# Patient Record
Sex: FEMALE | Race: WHITE | NOT HISPANIC OR LATINO | Employment: OTHER | URBAN - METROPOLITAN AREA
[De-identification: names, ages, dates, MRNs, and addresses within clinical notes are randomized per-mention and may not be internally consistent; named-entity substitution may affect disease eponyms.]

---

## 2017-01-12 RX ORDER — ALPRAZOLAM 0.5 MG/1
TABLET ORAL
Qty: 60 TABLET | Refills: 0 | OUTPATIENT
Start: 2017-01-12 | End: 2017-02-09 | Stop reason: SDUPTHER

## 2017-01-18 ENCOUNTER — OFFICE VISIT (OUTPATIENT)
Dept: FAMILY MEDICINE CLINIC | Facility: CLINIC | Age: 57
End: 2017-01-18

## 2017-01-18 VITALS
BODY MASS INDEX: 29.23 KG/M2 | SYSTOLIC BLOOD PRESSURE: 118 MMHG | HEIGHT: 63 IN | DIASTOLIC BLOOD PRESSURE: 78 MMHG | WEIGHT: 165 LBS | TEMPERATURE: 97.9 F

## 2017-01-18 DIAGNOSIS — F41.9 ANXIETY: ICD-10-CM

## 2017-01-18 DIAGNOSIS — M77.8 TENDONITIS OF ELBOW, RIGHT: Primary | ICD-10-CM

## 2017-01-18 DIAGNOSIS — M79.7 FIBROMYALGIA, PRIMARY: ICD-10-CM

## 2017-01-18 PROCEDURE — 20605 DRAIN/INJ JOINT/BURSA W/O US: CPT | Performed by: FAMILY MEDICINE

## 2017-01-18 PROCEDURE — 99213 OFFICE O/P EST LOW 20 MIN: CPT | Performed by: FAMILY MEDICINE

## 2017-01-18 RX ORDER — LIDOCAINE HYDROCHLORIDE 10 MG/ML
1 INJECTION, SOLUTION INFILTRATION; PERINEURAL ONCE
Status: COMPLETED | OUTPATIENT
Start: 2017-01-18 | End: 2017-01-18

## 2017-01-18 RX ORDER — DULOXETIN HYDROCHLORIDE 60 MG/1
120 CAPSULE, DELAYED RELEASE ORAL DAILY
Qty: 60 CAPSULE | Refills: 5 | Status: SHIPPED | OUTPATIENT
Start: 2017-01-18 | End: 2017-04-26 | Stop reason: SDUPTHER

## 2017-01-18 RX ORDER — HYDROCODONE BITARTRATE AND ACETAMINOPHEN 7.5; 325 MG/1; MG/1
TABLET ORAL
Qty: 150 TABLET | Refills: 0 | Status: SHIPPED | OUTPATIENT
Start: 2017-01-18 | End: 2017-02-17 | Stop reason: SDUPTHER

## 2017-01-18 RX ORDER — TRIAMCINOLONE ACETONIDE 40 MG/ML
20 INJECTION, SUSPENSION INTRA-ARTICULAR; INTRAMUSCULAR ONCE
Status: COMPLETED | OUTPATIENT
Start: 2017-01-18 | End: 2017-01-18

## 2017-01-18 RX ORDER — CARISOPRODOL 350 MG/1
350 TABLET ORAL 3 TIMES DAILY PRN
Qty: 120 TABLET | Refills: 0 | OUTPATIENT
Start: 2017-01-18 | End: 2017-01-21 | Stop reason: SDUPTHER

## 2017-01-18 RX ADMIN — LIDOCAINE HYDROCHLORIDE 1 ML: 10 INJECTION, SOLUTION INFILTRATION; PERINEURAL at 15:39

## 2017-01-18 RX ADMIN — TRIAMCINOLONE ACETONIDE 20 MG: 40 INJECTION, SUSPENSION INTRA-ARTICULAR; INTRAMUSCULAR at 15:40

## 2017-01-18 NOTE — PROGRESS NOTES
Chief Complaint and HPI    I have reviewed the patient's medical history in detail and updated the computerized patient record.    Subjective: Marina Sotelo is a 56 y.o. female presents   here today for     Anxiety (Xanax bid); Fibromyalgia (sleep sporadic-990mg cymbalta); Cyst (c/o cyst on back of neck x 2 months, tender to touch); and Pain (c/o pain in R elbow, x 6 weeks)      Review of Systems   Constitutional: Negative.    HENT: Negative.    Eyes: Negative.    Respiratory: Negative.    Cardiovascular: Negative.    Gastrointestinal: Negative.    Endocrine: Negative.    Genitourinary: Negative.    Musculoskeletal:        C/o pain in R elbow, c/o hands are contracted and pt has to run hot water to make them relax   Skin:        C/o cyst on back of neck    Allergic/Immunologic: Negative.    Neurological: Negative.    Hematological: Negative.    Psychiatric/Behavioral: Negative.        Physical Exam   Constitutional: She is oriented to person, place, and time. She appears well-developed and well-nourished.   Cardiovascular: Normal rate, regular rhythm, normal heart sounds and intact distal pulses.    Pulmonary/Chest: Effort normal and breath sounds normal.   Neurological: She is alert and oriented to person, place, and time.   Skin: Skin is warm and dry.   Vitals reviewed.      Arthrocentesis  Date/Time: 1/18/2017 3:35 PM  Performed by: CLINT VAUGHN  Authorized by: CLINT VAUGHN   Indications: pain   Body area: elbow  Joint: right elbow  Preparation: Patient was prepped and draped in the usual sterile fashion.  Betamethasone amount: 20 mg  Lidocaine 1% amount: 0.5 mL          Assessment:   Diagnosis Plan   1. Fibromyalgia, primary  lidocaine (XYLOCAINE) 1 % injection 1 mL    triamcinolone acetonide (KENALOG-40) injection 20 mg   2. Anxiety  lidocaine (XYLOCAINE) 1 % injection 1 mL    triamcinolone acetonide (KENALOG-40) injection 20 mg   3. Tendonitis of elbow, right  lidocaine (XYLOCAINE) 1 % injection  1 mL    triamcinolone acetonide (KENALOG-40) injection 20 mg       Plan:  Orders Placed This Encounter   Procedures   • Arthrocentesis     This order was created via procedure documentation       Requested Prescriptions     Signed Prescriptions Disp Refills   • DULoxetine (CYMBALTA) 60 MG capsule 60 capsule 5     Sig: Take 2 capsules by mouth Daily.   • HYDROcodone-acetaminophen (NORCO) 7.5-325 MG per tablet 150 tablet 0     Sig: Take one tablet by mouth every 4 hours prn mod pain  Indications: EARLY  REQUEST 4/13/16   • carisoprodol (SOMA) 350 MG tablet 120 tablet 0     Sig: Take 1 tablet by mouth 3 (Three) Times a Day As Needed for muscle spasms.       All tests and consults since last visit reviewed with patient    Return in about 3 months (around 4/18/2017) for Recheck.

## 2017-01-23 RX ORDER — CARISOPRODOL 350 MG/1
TABLET ORAL
Qty: 120 TABLET | Refills: 0 | OUTPATIENT
Start: 2017-01-23 | End: 2017-02-17 | Stop reason: SDUPTHER

## 2017-02-09 RX ORDER — ALPRAZOLAM 0.5 MG/1
TABLET ORAL
Qty: 60 TABLET | Refills: 0 | OUTPATIENT
Start: 2017-02-09 | End: 2017-03-10 | Stop reason: SDUPTHER

## 2017-02-16 ENCOUNTER — TELEPHONE (OUTPATIENT)
Dept: FAMILY MEDICINE CLINIC | Facility: CLINIC | Age: 57
End: 2017-02-16

## 2017-02-16 RX ORDER — HYDROCODONE BITARTRATE AND ACETAMINOPHEN 7.5; 325 MG/1; MG/1
TABLET ORAL
Qty: 150 TABLET | Refills: 0 | Status: CANCELLED | OUTPATIENT
Start: 2017-02-16

## 2017-02-17 RX ORDER — CARISOPRODOL 350 MG/1
350 TABLET ORAL 3 TIMES DAILY PRN
Qty: 120 TABLET | Refills: 0 | Status: SHIPPED | OUTPATIENT
Start: 2017-02-17 | End: 2017-03-17 | Stop reason: SDUPTHER

## 2017-02-17 RX ORDER — HYDROCODONE BITARTRATE AND ACETAMINOPHEN 7.5; 325 MG/1; MG/1
TABLET ORAL
Qty: 150 TABLET | Refills: 0 | Status: SHIPPED | OUTPATIENT
Start: 2017-02-17 | End: 2017-03-20 | Stop reason: SDUPTHER

## 2017-02-17 RX ORDER — VALACYCLOVIR HYDROCHLORIDE 500 MG/1
TABLET, FILM COATED ORAL
Qty: 20 TABLET | Refills: 0 | Status: SHIPPED | OUTPATIENT
Start: 2017-02-17 | End: 2017-05-07 | Stop reason: SDUPTHER

## 2017-03-11 RX ORDER — ALPRAZOLAM 0.5 MG/1
TABLET ORAL
Qty: 60 TABLET | Refills: 0 | OUTPATIENT
Start: 2017-03-11 | End: 2017-05-10 | Stop reason: SDUPTHER

## 2017-03-17 ENCOUNTER — OFFICE VISIT (OUTPATIENT)
Dept: FAMILY MEDICINE CLINIC | Facility: CLINIC | Age: 57
End: 2017-03-17

## 2017-03-17 VITALS
HEIGHT: 63 IN | SYSTOLIC BLOOD PRESSURE: 132 MMHG | DIASTOLIC BLOOD PRESSURE: 68 MMHG | OXYGEN SATURATION: 95 % | BODY MASS INDEX: 29.59 KG/M2 | TEMPERATURE: 98.6 F | HEART RATE: 84 BPM | WEIGHT: 167 LBS

## 2017-03-17 DIAGNOSIS — Z53.21 PATIENT LEFT AFTER TRIAGE: Primary | ICD-10-CM

## 2017-03-17 RX ORDER — CARISOPRODOL 350 MG/1
350 TABLET ORAL 3 TIMES DAILY PRN
Qty: 120 TABLET | Refills: 0 | Status: SHIPPED | OUTPATIENT
Start: 2017-03-17 | End: 2017-05-15 | Stop reason: SDUPTHER

## 2017-03-17 NOTE — PROGRESS NOTES
"Marina Sotelo is a 56 y.o. female   Chief Complaint   Patient presents with   • Ankle Pain     having pain in ankle and feet looks to be swollen        SILVINO  {[facility]:[id number]::\"Per House Bill #1 Requirements and [place] Board of Medical Licensure Regulations for prescribing of Schedule II and Schedule III with Hydrocodone, and other controlled medications for which the Board requires SILVINO reporting and regulation, the following drug treatment plan was developed and reviewed with the patient on the date of this encounter:\",\"          \",\"Controlled medication(s) taken: ***\",\"      \",\"Medical Indication (including pain relief and/or other physical and psychoosocial functional issue) for treatment: ***\",\"      \",\"Further Diagnostic tests, consultations, or treatments needed: ***\",\"        \",\"Plans for review of plan, adjustment and waning dose and further SILVINO evaluation include: ***\",\"              \",\"Risk for medication abuse for this patient based on physician review is felt to be extremely low.\"}    Subjective   History of Present Illness     Marina Sotelo is a 56 y.o.female who presents with:      The following portions of the patient's history were reviewed and updated as appropriate: past medical history, surgeries, family history, allergies, current medications, past social history and problem list.    A comprehensive review of 14 systems was peformed  Review of Systems   Constitutional: Negative for chills, fatigue, fever and unexpected weight change.   HENT: Negative for ear pain, hearing loss, sinus pressure, sore throat and tinnitus.    Eyes: Negative for pain, discharge and redness.   Respiratory: Negative for cough, shortness of breath and wheezing.    Cardiovascular: Negative for chest pain, palpitations and leg swelling.   Gastrointestinal: Negative for abdominal pain, constipation, diarrhea and nausea.   Endocrine: Negative for cold intolerance and heat intolerance.   Genitourinary: Negative for " "difficulty urinating, flank pain and urgency.   Musculoskeletal: Negative for back pain, joint swelling and myalgias.   Skin: Negative for rash and wound.   Allergic/Immunologic: Negative for environmental allergies and food allergies.   Neurological: Negative for dizziness, seizures, numbness and headaches.   Hematological: Negative for adenopathy. Does not bruise/bleed easily.   Psychiatric/Behavioral: Negative for decreased concentration, dysphoric mood and sleep disturbance. The patient is not nervous/anxious.    All other systems reviewed and are negative.      I have reviewed the patient's medical history in detail and updated the computerized patient record.  ***    Objective   Vitals:    03/17/17 1049   BP: 132/68   BP Location: Left arm   Patient Position: Sitting   Cuff Size: Adult   Pulse: 84   Temp: 98.6 °F (37 °C)   TempSrc: Oral   SpO2: 95%   Weight: 167 lb (75.8 kg)   Height: 63\" (160 cm)   PainSc: 8  Comment: back and ankles and heals of feet looks to be swollen a liittle   PainLoc: Ankle         ***  Physical Exam    Procedures    Reviewed consult notes from ***    Reviewed old notes from [facility]***    Reviewed actual xray films ***                        Assessment/Plan     There are no diagnoses linked to this encounter.       Ruperto Overton MA  3/17/2017  10:57 AM      "

## 2017-03-20 RX ORDER — HYDROCODONE BITARTRATE AND ACETAMINOPHEN 7.5; 325 MG/1; MG/1
TABLET ORAL
Qty: 150 TABLET | Refills: 0 | Status: SHIPPED | OUTPATIENT
Start: 2017-03-20 | End: 2017-04-17 | Stop reason: SDUPTHER

## 2017-04-17 RX ORDER — HYDROCODONE BITARTRATE AND ACETAMINOPHEN 7.5; 325 MG/1; MG/1
TABLET ORAL
Qty: 150 TABLET | Refills: 0 | Status: SHIPPED | OUTPATIENT
Start: 2017-04-17 | End: 2017-04-26 | Stop reason: SDUPTHER

## 2017-04-26 ENCOUNTER — OFFICE VISIT (OUTPATIENT)
Dept: FAMILY MEDICINE CLINIC | Facility: CLINIC | Age: 57
End: 2017-04-26

## 2017-04-26 VITALS
OXYGEN SATURATION: 97 % | SYSTOLIC BLOOD PRESSURE: 118 MMHG | HEART RATE: 90 BPM | HEIGHT: 63 IN | BODY MASS INDEX: 29.48 KG/M2 | WEIGHT: 166.4 LBS | DIASTOLIC BLOOD PRESSURE: 82 MMHG | TEMPERATURE: 97.7 F

## 2017-04-26 DIAGNOSIS — G89.4 CHRONIC PAIN SYNDROME: ICD-10-CM

## 2017-04-26 DIAGNOSIS — F41.9 ANXIETY: ICD-10-CM

## 2017-04-26 DIAGNOSIS — L40.9 PSORIASIS (A TYPE OF SKIN INFLAMMATION): Primary | ICD-10-CM

## 2017-04-26 PROCEDURE — 99213 OFFICE O/P EST LOW 20 MIN: CPT | Performed by: FAMILY MEDICINE

## 2017-04-26 RX ORDER — DULOXETIN HYDROCHLORIDE 60 MG/1
120 CAPSULE, DELAYED RELEASE ORAL DAILY
Qty: 60 CAPSULE | Refills: 5 | Status: SHIPPED | OUTPATIENT
Start: 2017-04-26 | End: 2018-08-09 | Stop reason: SDUPTHER

## 2017-04-26 RX ORDER — HYDROCODONE BITARTRATE AND ACETAMINOPHEN 7.5; 325 MG/1; MG/1
TABLET ORAL
Qty: 150 TABLET | Refills: 0 | Status: SHIPPED | OUTPATIENT
Start: 2017-04-26 | End: 2017-06-07 | Stop reason: SDUPTHER

## 2017-04-26 NOTE — PROGRESS NOTES
Chief Complaint and HPI istory in detail and updated the computerized patient record.    Subjective: Marina Sotelo is a 56 y.o. female presents   here today for  Review of Systems   Constitutional: Negative for chills, fatigue, fever and unexpected weight change.   HENT: Negative for ear pain, hearing loss, sinus pressure, sore throat and tinnitus.    Eyes: Negative for pain, discharge and redness.   Respiratory: Negative for cough, shortness of breath and wheezing.    Cardiovascular: Negative for chest pain, palpitations and leg swelling.   Gastrointestinal: Negative for abdominal pain, constipation, diarrhea and nausea.   Endocrine: Negative for cold intolerance and heat intolerance.   Genitourinary: Negative for difficulty urinating, flank pain and urgency.   Musculoskeletal: Negative for back pain, joint swelling and myalgias.   Skin: Negative for rash and wound.   Allergic/Immunologic: Negative for environmental allergies and food allergies.   Neurological: Negative for dizziness, seizures, numbness and headaches.   Hematological: Negative for adenopathy. Does not bruise/bleed easily.   Psychiatric/Behavioral: Negative for decreased concentration, dysphoric mood and sleep disturbance. The patient is not nervous/anxious.    All other systems reviewed and are negative.      Physical Exam   Constitutional: She is oriented to person, place, and time. She appears well-developed and well-nourished.   Cardiovascular: Normal rate, regular rhythm, normal heart sounds and intact distal pulses.    Pulmonary/Chest: Effort normal and breath sounds normal.   Neurological: She is alert and oriented to person, place, and time.   Psychiatric: She has a normal mood and affect. Her behavior is normal.   Vitals reviewed.      Procedures    Assessment:   Diagnosis Plan   1. Psoriasis (a type of skin inflammation)  Ambulatory Referral to Dermatology   2. Anxiety     3. Chronic pain syndrome         Plan:  Orders Placed This  Encounter   Procedures   • Ambulatory Referral to Dermatology     Referral Priority:   Routine     Referral Type:   Consultation     Referral Reason:   Specialty Services Required     Referred to Provider:   Denice Felix MD     Requested Specialty:   Dermatology     Number of Visits Requested:   1       Requested Prescriptions     Signed Prescriptions Disp Refills   • DULoxetine (CYMBALTA) 60 MG capsule 60 capsule 5     Sig: Take 2 capsules by mouth Daily.   • HYDROcodone-acetaminophen (NORCO) 7.5-325 MG per tablet 150 tablet 0     Sig: Take one tablet by mouth every 4 hours prn mod pain  Indications: EARLY  REQUEST 4/13/16       All tests and consults since last visit reviewed with patient    Return in about 3 months (around 7/26/2017).

## 2017-05-08 RX ORDER — VALACYCLOVIR HYDROCHLORIDE 500 MG/1
TABLET, FILM COATED ORAL
Qty: 20 TABLET | Refills: 0 | Status: SHIPPED | OUTPATIENT
Start: 2017-05-08 | End: 2017-07-20 | Stop reason: SDUPTHER

## 2017-05-10 RX ORDER — ALPRAZOLAM 0.5 MG/1
0.5 TABLET ORAL 2 TIMES DAILY
Qty: 60 TABLET | Refills: 0 | OUTPATIENT
Start: 2017-05-10 | End: 2017-06-06 | Stop reason: SDUPTHER

## 2017-05-15 RX ORDER — CARISOPRODOL 350 MG/1
350 TABLET ORAL 3 TIMES DAILY PRN
Qty: 120 TABLET | Refills: 0 | OUTPATIENT
Start: 2017-05-15 | End: 2017-06-12 | Stop reason: SDUPTHER

## 2017-06-06 RX ORDER — ALPRAZOLAM 0.5 MG/1
0.5 TABLET ORAL 2 TIMES DAILY
Qty: 60 TABLET | Refills: 0 | OUTPATIENT
Start: 2017-06-06 | End: 2017-07-03 | Stop reason: SDUPTHER

## 2017-06-07 RX ORDER — HYDROCODONE BITARTRATE AND ACETAMINOPHEN 7.5; 325 MG/1; MG/1
TABLET ORAL
Qty: 150 TABLET | Refills: 0 | Status: SHIPPED | OUTPATIENT
Start: 2017-06-07 | End: 2017-07-05 | Stop reason: SDUPTHER

## 2017-06-07 NOTE — TELEPHONE ENCOUNTER
----- Message from Jolanta العلي sent at 6/7/2017 11:34 AM EDT -----  Refill norco   lsd 4-26-17    Last office visit 4/26/17  Last fill 4/26/17  Last ammy 4/26/17

## 2017-06-12 RX ORDER — CARISOPRODOL 350 MG/1
350 TABLET ORAL 3 TIMES DAILY PRN
Qty: 120 TABLET | Refills: 0 | OUTPATIENT
Start: 2017-06-12 | End: 2017-07-07

## 2017-07-03 RX ORDER — ALPRAZOLAM 0.5 MG/1
TABLET ORAL
Qty: 60 TABLET | Refills: 0 | OUTPATIENT
Start: 2017-07-03 | End: 2017-07-03 | Stop reason: SDUPTHER

## 2017-07-05 ENCOUNTER — TELEPHONE (OUTPATIENT)
Dept: FAMILY MEDICINE CLINIC | Facility: CLINIC | Age: 57
End: 2017-07-05

## 2017-07-05 RX ORDER — ALPRAZOLAM 0.5 MG/1
TABLET ORAL
Qty: 60 TABLET | Refills: 0 | OUTPATIENT
Start: 2017-07-05 | End: 2017-08-04 | Stop reason: SDUPTHER

## 2017-07-05 RX ORDER — HYDROCODONE BITARTRATE AND ACETAMINOPHEN 7.5; 325 MG/1; MG/1
TABLET ORAL
Qty: 150 TABLET | Refills: 0 | Status: SHIPPED | OUTPATIENT
Start: 2017-07-05 | End: 2017-08-02 | Stop reason: SDUPTHER

## 2017-07-05 NOTE — TELEPHONE ENCOUNTER
----- Message from Jolanta العلي sent at 7/5/2017 10:32 AM EDT -----  Refill hydrocodone         lsd 4-26-17  Last fill 6/7/17  Last ammy 4/26/17  Due for new Narc contract  Next appt 8/2/17

## 2017-07-07 RX ORDER — CARISOPRODOL 350 MG/1
350 TABLET ORAL 4 TIMES DAILY PRN
Qty: 120 TABLET | Refills: 0 | OUTPATIENT
Start: 2017-07-07 | End: 2017-08-02 | Stop reason: SDUPTHER

## 2017-07-20 RX ORDER — VALACYCLOVIR HYDROCHLORIDE 500 MG/1
TABLET, FILM COATED ORAL
Qty: 20 TABLET | Refills: 0 | Status: SHIPPED | OUTPATIENT
Start: 2017-07-20 | End: 2017-08-02 | Stop reason: SDUPTHER

## 2017-07-28 RX ORDER — DULOXETIN HYDROCHLORIDE 60 MG/1
CAPSULE, DELAYED RELEASE ORAL
Qty: 60 CAPSULE | Refills: 0 | Status: SHIPPED | OUTPATIENT
Start: 2017-07-28 | End: 2017-08-25 | Stop reason: SDUPTHER

## 2017-08-02 ENCOUNTER — OFFICE VISIT (OUTPATIENT)
Dept: FAMILY MEDICINE CLINIC | Facility: CLINIC | Age: 57
End: 2017-08-02

## 2017-08-02 VITALS
SYSTOLIC BLOOD PRESSURE: 132 MMHG | TEMPERATURE: 98.2 F | BODY MASS INDEX: 30.02 KG/M2 | DIASTOLIC BLOOD PRESSURE: 70 MMHG | OXYGEN SATURATION: 96 % | HEART RATE: 68 BPM | WEIGHT: 169.4 LBS | HEIGHT: 63 IN

## 2017-08-02 DIAGNOSIS — G89.4 CHRONIC PAIN SYNDROME: ICD-10-CM

## 2017-08-02 DIAGNOSIS — M15.9 PRIMARY OSTEOARTHRITIS INVOLVING MULTIPLE JOINTS: ICD-10-CM

## 2017-08-02 DIAGNOSIS — Z79.899 ENCOUNTER FOR LONG-TERM (CURRENT) USE OF MEDICATIONS: Primary | ICD-10-CM

## 2017-08-02 LAB
POC AMPHETAMINES: NEGATIVE
POC BARBITURATES: NEGATIVE
POC BENZODIAZEPHINES: POSITIVE
POC COCAINE: NEGATIVE
POC METHADONE: NEGATIVE
POC METHAMPHETAMINE SCREEN URINE: NEGATIVE
POC OPIATES: POSITIVE
POC OXYCODONE: NEGATIVE
POC PHENCYCLIDINE: NEGATIVE
POC PROPOXYPHENE: NEGATIVE
POC THC: NEGATIVE
POC TRICYCLIC ANTIDEPRESSANTS: NEGATIVE

## 2017-08-02 PROCEDURE — 99213 OFFICE O/P EST LOW 20 MIN: CPT | Performed by: FAMILY MEDICINE

## 2017-08-02 RX ORDER — CARISOPRODOL 350 MG/1
350 TABLET ORAL 4 TIMES DAILY PRN
Qty: 120 TABLET | Refills: 2 | Status: SHIPPED | OUTPATIENT
Start: 2017-08-02 | End: 2017-09-04 | Stop reason: SDUPTHER

## 2017-08-02 RX ORDER — HYDROCODONE BITARTRATE AND ACETAMINOPHEN 7.5; 325 MG/1; MG/1
TABLET ORAL
Qty: 150 TABLET | Refills: 0 | Status: SHIPPED | OUTPATIENT
Start: 2017-08-02 | End: 2017-09-01 | Stop reason: SDUPTHER

## 2017-08-02 RX ORDER — HALOBETASOL PROPIONATE 0.05 %
0.05 OINTMENT (GRAM) TOPICAL 2 TIMES DAILY
COMMUNITY
Start: 2017-06-28 | End: 2017-10-09 | Stop reason: SDUPTHER

## 2017-08-02 NOTE — PROGRESS NOTES
Subjective.../ HPI  I have reviewed the patient's medical history in detail and updated the computerized patient record.    Subjective: Marina Sotelo is a 56 y.o. female presents here today for-    Arthritis (follow up )  Has sever arthritis 25 yrs. !5 yrs on pain meds Needs meds to be able to to work    No family history on file.    Social History     Social History   • Marital status:      Spouse name: N/A   • Number of children: N/A   • Years of education: N/A     Occupational History   • Not on file.     Social History Main Topics   • Smoking status: Current Every Day Smoker   • Smokeless tobacco: Never Used   • Alcohol use Not on file      Comment: occasional   • Drug use: Yes     Special: Hydrocodone   • Sexual activity: Defer     Other Topics Concern   • Not on file     Social History Narrative       Review of Systems   Constitutional: Negative for chills, fatigue, fever and unexpected weight change.   HENT: Negative for ear pain, hearing loss, sinus pressure, sore throat and tinnitus.    Eyes: Negative for pain, discharge and redness.   Respiratory: Negative for cough, shortness of breath and wheezing.    Cardiovascular: Negative for chest pain, palpitations and leg swelling.   Gastrointestinal: Negative for abdominal pain, constipation, diarrhea and nausea.   Endocrine: Negative for cold intolerance and heat intolerance.   Genitourinary: Negative for difficulty urinating, flank pain and urgency.   Musculoskeletal: Negative for back pain, joint swelling and myalgias.   Skin: Negative for rash and wound.   Allergic/Immunologic: Negative for environmental allergies and food allergies.   Neurological: Negative for dizziness, seizures, numbness and headaches.   Hematological: Negative for adenopathy. Does not bruise/bleed easily.   Psychiatric/Behavioral: Negative for decreased concentration, dysphoric mood and sleep disturbance. The patient is not nervous/anxious.    All other systems reviewed and  are negative.        Physical Exam   Constitutional: She is oriented to person, place, and time. She appears well-developed and well-nourished.   Cardiovascular: Normal rate, regular rhythm, normal heart sounds and intact distal pulses.    Pulmonary/Chest: Effort normal and breath sounds normal.   Neurological: She is alert and oriented to person, place, and time.   Psychiatric: She has a normal mood and affect. Her behavior is normal.   Vitals reviewed.      Procedures    Marina was seen today for arthritis.    Diagnoses and all orders for this visit:    Encounter for long-term (current) use of medications  -     POC Urine Drug Screen, Triage    Chronic pain syndrome  -     POC Urine Drug Screen, Triage  -     HYDROcodone-acetaminophen (NORCO) 7.5-325 MG per tablet; Take one tablet by mouth every 4 hours prn mod pain  Indications: EARLY  REQUEST 4/13/16  -     carisoprodol (SOMA) 350 MG tablet; Take 1 tablet by mouth 4 (Four) Times a Day As Needed for Muscle Spasms.    Primary osteoarthritis involving multiple joints  -     POC Urine Drug Screen, Triage  -     HYDROcodone-acetaminophen (NORCO) 7.5-325 MG per tablet; Take one tablet by mouth every 4 hours prn mod pain  Indications: EARLY  REQUEST 4/13/16  -     carisoprodol (SOMA) 350 MG tablet; Take 1 tablet by mouth 4 (Four) Times a Day As Needed for Muscle Spasms.        Requested Prescriptions     Signed Prescriptions Disp Refills   • HYDROcodone-acetaminophen (NORCO) 7.5-325 MG per tablet 150 tablet 0     Sig: Take one tablet by mouth every 4 hours prn mod pain  Indications: EARLY  REQUEST 4/13/16   • carisoprodol (SOMA) 350 MG tablet 120 tablet 2     Sig: Take 1 tablet by mouth 4 (Four) Times a Day As Needed for Muscle Spasms.       Results Review:    REVIEWED AND DISCUSSED CLINICAL RESULTS WITH PATIENT    Return in about 3 months (around 11/2/2017) for Recheck.

## 2017-08-03 RX ORDER — VALACYCLOVIR HYDROCHLORIDE 500 MG/1
TABLET, FILM COATED ORAL
Qty: 20 TABLET | Refills: 0 | Status: SHIPPED | OUTPATIENT
Start: 2017-08-03 | End: 2017-11-21 | Stop reason: SDUPTHER

## 2017-08-04 RX ORDER — ALPRAZOLAM 0.5 MG/1
0.5 TABLET ORAL 2 TIMES DAILY
Qty: 60 TABLET | Refills: 0 | OUTPATIENT
Start: 2017-08-04 | End: 2017-09-02 | Stop reason: SDUPTHER

## 2017-08-25 RX ORDER — DULOXETIN HYDROCHLORIDE 60 MG/1
CAPSULE, DELAYED RELEASE ORAL
Qty: 60 CAPSULE | Refills: 0 | Status: SHIPPED | OUTPATIENT
Start: 2017-08-25 | End: 2017-09-25 | Stop reason: SDUPTHER

## 2017-09-01 DIAGNOSIS — M15.9 PRIMARY OSTEOARTHRITIS INVOLVING MULTIPLE JOINTS: ICD-10-CM

## 2017-09-01 DIAGNOSIS — G89.4 CHRONIC PAIN SYNDROME: ICD-10-CM

## 2017-09-01 RX ORDER — HYDROCODONE BITARTRATE AND ACETAMINOPHEN 7.5; 325 MG/1; MG/1
TABLET ORAL
Qty: 150 TABLET | Refills: 0 | Status: SHIPPED | OUTPATIENT
Start: 2017-09-01 | End: 2017-10-02 | Stop reason: SDUPTHER

## 2017-09-02 RX ORDER — ALPRAZOLAM 0.5 MG/1
0.5 TABLET ORAL 2 TIMES DAILY
Qty: 60 TABLET | Refills: 0 | OUTPATIENT
Start: 2017-09-02 | End: 2017-10-03 | Stop reason: SDUPTHER

## 2017-09-04 DIAGNOSIS — G89.4 CHRONIC PAIN SYNDROME: ICD-10-CM

## 2017-09-04 DIAGNOSIS — M15.9 PRIMARY OSTEOARTHRITIS INVOLVING MULTIPLE JOINTS: ICD-10-CM

## 2017-09-05 RX ORDER — CARISOPRODOL 350 MG/1
TABLET ORAL
Qty: 120 TABLET | Refills: 0 | Status: SHIPPED | OUTPATIENT
Start: 2017-09-05 | End: 2017-09-06 | Stop reason: SDUPTHER

## 2017-09-06 DIAGNOSIS — M15.9 PRIMARY OSTEOARTHRITIS INVOLVING MULTIPLE JOINTS: ICD-10-CM

## 2017-09-06 DIAGNOSIS — G89.4 CHRONIC PAIN SYNDROME: ICD-10-CM

## 2017-09-06 RX ORDER — CARISOPRODOL 350 MG/1
TABLET ORAL
Qty: 120 TABLET | Refills: 0 | Status: SHIPPED | OUTPATIENT
Start: 2017-09-06 | End: 2017-10-02 | Stop reason: SDUPTHER

## 2017-09-26 RX ORDER — DULOXETIN HYDROCHLORIDE 60 MG/1
CAPSULE, DELAYED RELEASE ORAL
Qty: 60 CAPSULE | Refills: 0 | Status: SHIPPED | OUTPATIENT
Start: 2017-09-26 | End: 2017-10-09

## 2017-10-02 DIAGNOSIS — G89.4 CHRONIC PAIN SYNDROME: ICD-10-CM

## 2017-10-02 DIAGNOSIS — M15.9 PRIMARY OSTEOARTHRITIS INVOLVING MULTIPLE JOINTS: ICD-10-CM

## 2017-10-02 RX ORDER — CARISOPRODOL 350 MG/1
TABLET ORAL
Qty: 120 TABLET | Refills: 0 | Status: SHIPPED | OUTPATIENT
Start: 2017-10-02 | End: 2017-10-30 | Stop reason: SDUPTHER

## 2017-10-02 RX ORDER — HYDROCODONE BITARTRATE AND ACETAMINOPHEN 7.5; 325 MG/1; MG/1
TABLET ORAL
Qty: 150 TABLET | Refills: 0 | Status: SHIPPED | OUTPATIENT
Start: 2017-10-02 | End: 2017-10-30 | Stop reason: SDUPTHER

## 2017-10-02 NOTE — TELEPHONE ENCOUNTER
Refill Norco    Last visit -8/2/17   Last refill - 9/1/17  Last ammy - 8/1/17  Next appt- 10/31/17              ----- Message from Mere Montanez sent at 10/2/2017 11:27 AM EDT -----  Regarding: SCRIPT  Contact: [phone]  LDS: 8/2/17  NEXT APPT: 10/31/17      HYDROcodone-acetaminophen (NORCO) 7.5-325 MG per tablet    INFORMED PATIENT TO GIVE 48 HRS FOR SCRIPT AND SOMEONE WILL CALL WHEN IT IS READY FOR .    THANK YOU

## 2017-10-03 RX ORDER — ALPRAZOLAM 0.5 MG/1
0.5 TABLET ORAL 2 TIMES DAILY
Qty: 60 TABLET | Refills: 0 | OUTPATIENT
Start: 2017-10-03 | End: 2017-11-03 | Stop reason: SDUPTHER

## 2017-10-09 ENCOUNTER — OFFICE VISIT (OUTPATIENT)
Dept: FAMILY MEDICINE CLINIC | Facility: CLINIC | Age: 57
End: 2017-10-09

## 2017-10-09 VITALS
HEART RATE: 92 BPM | SYSTOLIC BLOOD PRESSURE: 126 MMHG | TEMPERATURE: 98.1 F | BODY MASS INDEX: 30.02 KG/M2 | OXYGEN SATURATION: 99 % | HEIGHT: 63 IN | WEIGHT: 169.4 LBS | DIASTOLIC BLOOD PRESSURE: 78 MMHG

## 2017-10-09 DIAGNOSIS — H60.393 OTHER INFECTIVE ACUTE OTITIS EXTERNA OF BOTH EARS: Primary | ICD-10-CM

## 2017-10-09 DIAGNOSIS — F41.9 ANXIETY: ICD-10-CM

## 2017-10-09 DIAGNOSIS — G89.4 CHRONIC PAIN SYNDROME: ICD-10-CM

## 2017-10-09 DIAGNOSIS — Z23 IMMUNIZATION DUE: ICD-10-CM

## 2017-10-09 PROCEDURE — 90471 IMMUNIZATION ADMIN: CPT | Performed by: NURSE PRACTITIONER

## 2017-10-09 PROCEDURE — 99213 OFFICE O/P EST LOW 20 MIN: CPT | Performed by: NURSE PRACTITIONER

## 2017-10-09 PROCEDURE — 90686 IIV4 VACC NO PRSV 0.5 ML IM: CPT | Performed by: NURSE PRACTITIONER

## 2017-10-09 NOTE — PATIENT INSTRUCTIONS
Bacitracin; Hydrocortisone; Neomycin; Polymyxin B eye ointment  What is this medicine?  BACITRACIN; HYDROCORTISONE; NEOMYCIN; POLYMYXIN B (bass i TRAY sin; edson droe KOR ti sone; nee oh MYE sin; rashard i MIX in bee) helps to reduce swelling, redness, and itching of the eyes. It is also used to treat eye infections.  This medicine may be used for other purposes; ask your health care provider or pharmacist if you have questions.  COMMON BRAND NAME(S): AK-Spore HC, AK-Spore HC Ophthalmic, Cortisporin, Cortomycin, Jaspreet-Polycin HC, Ocu-Emmanuel  What should I tell my health care provider before I take this medicine?  They need to know if you have any of these conditions:  -any other active infection  -glaucoma  -recent cataract surgery  -wear contact lenses  -an unusual or allergic reaction to bacitracin, hydrocortisone, neomycin, polymyxin B, corticosteroids, other medicines, foods, dyes, or preservatives  -pregnant or trying to get pregnant  -breast-feeding  How should I use this medicine?  This medicine is only for use in the eye. Follow the directions on the prescription label. Wash hands before and after use. Tilt your head back slightly and pull your lower eyelid down with your index finger to form a pouch. Try not to touch the tip of the tube to your eye, fingertips, or other surface. Squeeze the end of the ointment tube to apply a thin layer of the ointment to the inside of the eyelid. Close the eye gently to spread the ointment. Do not use your medicine more often than directed. Finish the full course of medicine prescribed by your doctor or health care professional even if think your condition is better. Do not stop using except on the advice of your doctor or health care professional.  Talk to your pediatrician regarding the use of this medicine in children. Special care may be needed.  Overdosage: If you think you have taken too much of this medicine contact a poison control center or emergency room at once.  NOTE:  This medicine is only for you. Do not share this medicine with others.  What if I miss a dose?  If you miss a dose, use it as soon as you can. If it is almost time for your next dose, use only that dose. Do not use double or extra doses.  What may interact with this medicine?  Interactions are not expected. Do not use any other eye products without telling your doctor or health care professional.  This list may not describe all possible interactions. Give your health care provider a list of all the medicines, herbs, non-prescription drugs, or dietary supplements you use. Also tell them if you smoke, drink alcohol, or use illegal drugs. Some items may interact with your medicine.  What should I watch for while using this medicine?  Check with your doctor or health care professional if your condition does not get better after 2 days, or if it gets worse. Do not use longer than 10 days unless instructed by your doctor.  Tell your doctor or health care professional if you are exposed to anyone with measles or chickenpox, or if you develop sores or blisters that do not heal properly.  If you wear contact lenses, ask your doctor or health care professional when you can use your lenses again.  A burning or stinging reaction that does not go away may mean you are allergic to this product. Stop using and call your doctor or health care professional.  This medicine can make certain eye conditions worse. Only use it for conditions for which your doctor or health care professional has prescribed.  To prevent the spread of infection, do not share eye products, towels, and washcloths with anyone else. Throw away any unused eye products.  What side effects may I notice from receiving this medicine?  Side effects that you should report to your doctor or health care professional as soon as possible:  -allergic reactions like skin rash, itching or hives, swelling of the face, lips, or tongue  -changes in vision  -watery eyes  Side  effects that usually do not require medical attention (report to your doctor or health care professional if they continue or are bothersome):  -eye irritation, itching  -mild burning, redness or stinging in the eye  -temporary watering or blurring of vision  This list may not describe all possible side effects. Call your doctor for medical advice about side effects. You may report side effects to FDA at 3-453-WRZ-3748.  Where should I keep my medicine?  Keep out of the reach of children.  Store between 15 and 25 degrees C (59 and 77 degrees F). Throw away any unused medicine after the expiration date.  NOTE: This sheet is a summary. It may not cover all possible information. If you have questions about this medicine, talk to your doctor, pharmacist, or health care provider.     © 2017, [employer]. (2015-07-23 09:42:52)

## 2017-10-09 NOTE — PROGRESS NOTES
Subjective   Marina Sotelo is a 56 y.o. female presents with right ear pain since Friday, gradually worsening with decreased hearing. Feels full. Pain was going into jaw line but now improved.  Also presents for 3 month follow up for pain management and anxiety. Takes hydrocodone for arthritis pain. 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 Takes xanax for anxiety. Typically takes two daily.     Earache    There is pain in the right ear. This is a new problem. The current episode started in the past 7 days. The problem occurs every few minutes. There has been no fever. The fever has been present for less than 1 day. The pain is at a severity of 8/10. The pain is moderate. Associated symptoms include coughing (intermittent, due to postnasal draiange), neck pain (on right side, now resolved) and rhinorrhea. Pertinent negatives include no abdominal pain, diarrhea, ear discharge, headaches, hearing loss, rash, sore throat or vomiting. She has tried ear drops for the symptoms. The treatment provided moderate relief. There is no history of a chronic ear infection, hearing loss or a tympanostomy tube.        The following portions of the patient's history were reviewed and updated as appropriate: allergies, current medications, past family history, past medical history, past social history, past surgical history and problem list.    Review of Systems   Constitutional: Negative.    HENT: Positive for ear pain and rhinorrhea. Negative for ear discharge, hearing loss and sore throat.    Eyes: Negative.    Respiratory: Positive for cough (intermittent, due to postnasal draiange).    Cardiovascular: Negative.    Gastrointestinal: Negative.  Negative for abdominal pain, diarrhea and vomiting.   Endocrine: Negative.    Genitourinary: Negative.    Musculoskeletal: Positive for neck pain (on right side, now resolved).   Skin: Negative.  Negative for rash.   Allergic/Immunologic: Negative.     Neurological: Negative.  Negative for headaches.   Hematological: Negative.    Psychiatric/Behavioral: Negative.        Objective   Physical Exam   Constitutional: She appears well-developed and well-nourished.   HENT:   Right Ear: Tympanic membrane normal.   Left Ear: Tympanic membrane normal.   Nose: Nose normal.   Mouth/Throat: Uvula is midline, oropharynx is clear and moist and mucous membranes are normal. No tonsillar exudate.   Pain with palpating tragus/pinna bilaterally  TM visualized bilaterally, canal edematous, moist, no erythema       Neck: Neck supple.   Cardiovascular: Normal rate, regular rhythm and normal heart sounds.  Exam reveals no gallop and no friction rub.    No murmur heard.  Pulmonary/Chest: Breath sounds normal. No respiratory distress. She has no wheezes. She has no rales.   Skin: Skin is warm and dry.   Psychiatric: She has a normal mood and affect.   Vitals reviewed.      Assessment/Plan   Marina was seen today for earache.    Diagnoses and all orders for this visit:    Other infective acute otitis externa of both ears    Anxiety    Chronic pain syndrome    Other orders  -     neomycin-polymyxin-hydrocortisone (CORTISPORIN) 3.5-38884-3 otic solution; Administer 3 drops into both ears 3 (Three) Times a Day.

## 2017-10-11 ENCOUNTER — TELEPHONE (OUTPATIENT)
Dept: FAMILY MEDICINE CLINIC | Facility: CLINIC | Age: 57
End: 2017-10-11

## 2017-10-11 RX ORDER — CEFDINIR 300 MG/1
300 CAPSULE ORAL 2 TIMES DAILY
Qty: 20 CAPSULE | Refills: 0 | Status: SHIPPED | OUTPATIENT
Start: 2017-10-11 | End: 2017-10-13 | Stop reason: SDUPTHER

## 2017-10-11 NOTE — TELEPHONE ENCOUNTER
----- Message from Mere Montanez sent at 10/11/2017 12:03 PM EDT -----  Regarding: CALL BACK  Contact: [phone]  PATIENT SAID SHE NEEDS AN ANTIBIOTIC FOR HER EARS. DROPS NOT WORKING AND BOTH EARS ARE NOW INFECTED. SHE IS EXPERIENCING LOSS OF BALANCE AND HEARING.    Allergies:  Penicillins    PLEASE CALL    THANK YOU

## 2017-10-13 ENCOUNTER — OFFICE VISIT (OUTPATIENT)
Dept: FAMILY MEDICINE CLINIC | Facility: CLINIC | Age: 57
End: 2017-10-13

## 2017-10-13 VITALS
WEIGHT: 173 LBS | HEART RATE: 67 BPM | OXYGEN SATURATION: 98 % | BODY MASS INDEX: 30.65 KG/M2 | SYSTOLIC BLOOD PRESSURE: 126 MMHG | DIASTOLIC BLOOD PRESSURE: 74 MMHG | TEMPERATURE: 98.3 F | HEIGHT: 63 IN

## 2017-10-13 DIAGNOSIS — J30.1 ACUTE SEASONAL ALLERGIC RHINITIS DUE TO POLLEN: ICD-10-CM

## 2017-10-13 DIAGNOSIS — J01.00 ACUTE NON-RECURRENT MAXILLARY SINUSITIS: Primary | ICD-10-CM

## 2017-10-13 PROBLEM — J30.9 ACUTE ALLERGIC RHINITIS: Status: ACTIVE | Noted: 2017-10-13

## 2017-10-13 PROCEDURE — 99213 OFFICE O/P EST LOW 20 MIN: CPT | Performed by: FAMILY MEDICINE

## 2017-10-13 RX ORDER — AZELASTINE 1 MG/ML
2 SPRAY, METERED NASAL 2 TIMES DAILY
Qty: 1 EACH | Refills: 12 | Status: SHIPPED | OUTPATIENT
Start: 2017-10-13 | End: 2018-03-01

## 2017-10-13 RX ORDER — MONTELUKAST SODIUM 10 MG/1
10 TABLET ORAL NIGHTLY
Qty: 30 TABLET | Refills: 11 | Status: SHIPPED | OUTPATIENT
Start: 2017-10-13 | End: 2018-08-09 | Stop reason: SDUPTHER

## 2017-10-13 RX ORDER — AZELASTINE HYDROCHLORIDE, FLUTICASONE PROPIONATE 137; 50 UG/1; UG/1
1 SPRAY, METERED NASAL 2 TIMES DAILY
Qty: 1 BOTTLE | Refills: 11 | Status: SHIPPED | OUTPATIENT
Start: 2017-10-13 | End: 2018-03-01

## 2017-10-13 RX ORDER — CEFDINIR 300 MG/1
300 CAPSULE ORAL 2 TIMES DAILY
Qty: 20 CAPSULE | Refills: 0 | Status: SHIPPED | OUTPATIENT
Start: 2017-10-13 | End: 2018-03-01

## 2017-10-13 RX ORDER — NEOMYCIN SULFATE, POLYMYXIN B SULFATE AND HYDROCORTISONE 10; 3.5; 1 MG/ML; MG/ML; [USP'U]/ML
SUSPENSION/ DROPS AURICULAR (OTIC)
COMMUNITY
Start: 2017-10-09 | End: 2018-03-01

## 2017-10-13 NOTE — PROGRESS NOTES
Subjective.../ HPI  I have reviewed the patient's medical history in detail and updated the computerized patient record.    Subjective: Marina Sotelo is a 56 y.o. female presents here today for-    Earache (bilateral earache and balance problems for a week)      No family history on file.    Social History     Social History   • Marital status:      Spouse name: N/A   • Number of children: N/A   • Years of education: N/A     Occupational History   • Not on file.     Social History Main Topics   • Smoking status: Current Every Day Smoker   • Smokeless tobacco: Never Used   • Alcohol use Not on file      Comment: occasional   • Drug use: Yes     Special: Hydrocodone   • Sexual activity: Defer     Other Topics Concern   • Not on file     Social History Narrative       Review of Systems   Constitutional: Negative.    HENT: Positive for ear pain, hearing loss and tinnitus.    Eyes: Negative.    Cardiovascular: Negative.    Gastrointestinal: Negative.    Endocrine: Negative.    Genitourinary: Negative.    Musculoskeletal: Negative.    Skin: Negative.    Allergic/Immunologic: Negative.    Neurological: Positive for dizziness and light-headedness.   Hematological: Negative.    Psychiatric/Behavioral: Negative.        Physical Exam   Constitutional: She is oriented to person, place, and time. She appears well-developed and well-nourished.   HENT:   Maxillary tenderness   Cardiovascular: Normal rate, regular rhythm, normal heart sounds and intact distal pulses.    Pulmonary/Chest: Effort normal and breath sounds normal.   Neurological: She is oriented to person, place, and time.   Psychiatric: She has a normal mood and affect.   Vitals reviewed.      Procedures    Marina was seen today for earache.    Diagnoses and all orders for this visit:    Acute non-recurrent maxillary sinusitis  -     cefdinir (OMNICEF) 300 MG capsule; Take 1 capsule by mouth 2 (Two) Times a Day.    Acute seasonal allergic rhinitis due to  pollen  -     Azelastine-Fluticasone (DYMISTA) 137-50 MCG/ACT suspension; 1 spray into each nostril 2 (Two) Times a Day.  -     montelukast (SINGULAIR) 10 MG tablet; Take 1 tablet by mouth Every Night.    Other orders  -     azelastine (ASTELIN) 0.1 % nasal spray; 2 sprays into each nostril 2 (Two) Times a Day. Use in each nostril as directed        Requested Prescriptions     Signed Prescriptions Disp Refills   • Azelastine-Fluticasone (DYMISTA) 137-50 MCG/ACT suspension 1 bottle 11     Si spray into each nostril 2 (Two) Times a Day.   • azelastine (ASTELIN) 0.1 % nasal spray 1 each 12     Si sprays into each nostril 2 (Two) Times a Day. Use in each nostril as directed   • montelukast (SINGULAIR) 10 MG tablet 30 tablet 11     Sig: Take 1 tablet by mouth Every Night.   • cefdinir (OMNICEF) 300 MG capsule 20 capsule 0     Sig: Take 1 capsule by mouth 2 (Two) Times a Day.       Results Review:    REVIEWED AND DISCUSSED CLINICAL RESULTS WITH PATIENT    No Follow-up on file.

## 2017-10-24 RX ORDER — DULOXETIN HYDROCHLORIDE 60 MG/1
CAPSULE, DELAYED RELEASE ORAL
Qty: 60 CAPSULE | Refills: 5 | Status: SHIPPED | OUTPATIENT
Start: 2017-10-24 | End: 2018-03-01 | Stop reason: SDUPTHER

## 2017-10-30 DIAGNOSIS — G89.4 CHRONIC PAIN SYNDROME: ICD-10-CM

## 2017-10-30 DIAGNOSIS — M15.9 PRIMARY OSTEOARTHRITIS INVOLVING MULTIPLE JOINTS: ICD-10-CM

## 2017-10-30 RX ORDER — HYDROCODONE BITARTRATE AND ACETAMINOPHEN 7.5; 325 MG/1; MG/1
TABLET ORAL
Qty: 150 TABLET | Refills: 0 | Status: SHIPPED | OUTPATIENT
Start: 2017-10-30 | End: 2017-11-27 | Stop reason: SDUPTHER

## 2017-10-30 RX ORDER — CARISOPRODOL 350 MG/1
TABLET ORAL
Qty: 120 TABLET | Refills: 0 | OUTPATIENT
Start: 2017-10-30 | End: 2017-11-24 | Stop reason: SDUPTHER

## 2017-10-30 NOTE — TELEPHONE ENCOUNTER
----- Message from Mere Montanez sent at 10/30/2017 12:20 PM EDT -----  Regarding: SCRIPT  Contact: [phone]  LDS: 10/13/17  NEXT APPT: 1/23/18    HYDROcodone-acetaminophen (NORCO) 7.5-325 MG per tablet    INFORMED PATIENT TO GIVE 24-48 HRS FOR SCRIPT AND SOMEONE WILL CALL WHEN READY FOR .    THANK YOU  Last fill 10/2/17  Last ammy 10/3/17  Next appt 1/23/18

## 2017-11-03 RX ORDER — ALPRAZOLAM 0.5 MG/1
0.5 TABLET ORAL 2 TIMES DAILY
Qty: 60 TABLET | Refills: 0 | OUTPATIENT
Start: 2017-11-03 | End: 2017-12-14 | Stop reason: SDUPTHER

## 2017-11-21 RX ORDER — VALACYCLOVIR HYDROCHLORIDE 500 MG/1
TABLET, FILM COATED ORAL
Qty: 20 TABLET | Refills: 0 | Status: SHIPPED | OUTPATIENT
Start: 2017-11-21 | End: 2018-05-14 | Stop reason: SDUPTHER

## 2017-11-24 DIAGNOSIS — G89.4 CHRONIC PAIN SYNDROME: ICD-10-CM

## 2017-11-24 DIAGNOSIS — M15.9 PRIMARY OSTEOARTHRITIS INVOLVING MULTIPLE JOINTS: ICD-10-CM

## 2017-11-27 DIAGNOSIS — G89.4 CHRONIC PAIN SYNDROME: ICD-10-CM

## 2017-11-27 DIAGNOSIS — M15.9 PRIMARY OSTEOARTHRITIS INVOLVING MULTIPLE JOINTS: ICD-10-CM

## 2017-11-27 RX ORDER — HYDROCODONE BITARTRATE AND ACETAMINOPHEN 7.5; 325 MG/1; MG/1
TABLET ORAL
Qty: 150 TABLET | Refills: 0 | Status: SHIPPED | OUTPATIENT
Start: 2017-11-27 | End: 2017-12-19 | Stop reason: SDUPTHER

## 2017-11-27 RX ORDER — CARISOPRODOL 350 MG/1
TABLET ORAL
Qty: 120 TABLET | Refills: 0 | Status: SHIPPED | OUTPATIENT
Start: 2017-11-27 | End: 2017-11-28 | Stop reason: SDUPTHER

## 2017-11-28 DIAGNOSIS — M15.9 PRIMARY OSTEOARTHRITIS INVOLVING MULTIPLE JOINTS: ICD-10-CM

## 2017-11-28 DIAGNOSIS — G89.4 CHRONIC PAIN SYNDROME: ICD-10-CM

## 2017-11-28 RX ORDER — CARISOPRODOL 350 MG/1
TABLET ORAL
Qty: 120 TABLET | Refills: 1 | Status: SHIPPED | OUTPATIENT
Start: 2017-11-28 | End: 2017-12-21 | Stop reason: SDUPTHER

## 2017-12-15 RX ORDER — ALPRAZOLAM 0.5 MG/1
TABLET ORAL
Qty: 60 TABLET | Refills: 2 | Status: SHIPPED | OUTPATIENT
Start: 2017-12-15 | End: 2017-12-15 | Stop reason: SDUPTHER

## 2017-12-18 RX ORDER — ALPRAZOLAM 0.5 MG/1
TABLET ORAL
Qty: 60 TABLET | Refills: 0 | OUTPATIENT
Start: 2017-12-18 | End: 2018-03-19 | Stop reason: SDUPTHER

## 2017-12-19 DIAGNOSIS — M15.9 PRIMARY OSTEOARTHRITIS INVOLVING MULTIPLE JOINTS: ICD-10-CM

## 2017-12-19 DIAGNOSIS — G89.4 CHRONIC PAIN SYNDROME: ICD-10-CM

## 2017-12-19 RX ORDER — HYDROCODONE BITARTRATE AND ACETAMINOPHEN 7.5; 325 MG/1; MG/1
TABLET ORAL
Qty: 150 TABLET | Refills: 0 | Status: SHIPPED | OUTPATIENT
Start: 2017-12-19 | End: 2018-01-16 | Stop reason: SDUPTHER

## 2017-12-19 NOTE — TELEPHONE ENCOUNTER
Atten: Dr Galaviz.       Contact Marina [phone]      [facility] 10/13/17      Jj 10/30/17  Contract 08/02/17      Pt is requesting a refill on her Norco   It will be due 12/27/17

## 2017-12-21 DIAGNOSIS — G89.4 CHRONIC PAIN SYNDROME: ICD-10-CM

## 2017-12-21 DIAGNOSIS — M15.9 PRIMARY OSTEOARTHRITIS INVOLVING MULTIPLE JOINTS: ICD-10-CM

## 2017-12-21 RX ORDER — CARISOPRODOL 350 MG/1
TABLET ORAL
Qty: 120 TABLET | Refills: 1 | Status: SHIPPED | OUTPATIENT
Start: 2017-12-21 | End: 2017-12-21 | Stop reason: SDUPTHER

## 2017-12-21 RX ORDER — CARISOPRODOL 350 MG/1
350 TABLET ORAL 4 TIMES DAILY PRN
Qty: 120 TABLET | Refills: 1 | OUTPATIENT
Start: 2017-12-21 | End: 2018-02-14 | Stop reason: SDUPTHER

## 2018-01-16 DIAGNOSIS — G89.4 CHRONIC PAIN SYNDROME: ICD-10-CM

## 2018-01-16 DIAGNOSIS — M15.9 PRIMARY OSTEOARTHRITIS INVOLVING MULTIPLE JOINTS: ICD-10-CM

## 2018-01-16 RX ORDER — HYDROCODONE BITARTRATE AND ACETAMINOPHEN 7.5; 325 MG/1; MG/1
TABLET ORAL
Qty: 150 TABLET | Refills: 0 | Status: SHIPPED | OUTPATIENT
Start: 2018-01-16 | End: 2018-02-13 | Stop reason: SDUPTHER

## 2018-01-23 ENCOUNTER — OFFICE VISIT (OUTPATIENT)
Dept: INTERNAL MEDICINE | Facility: CLINIC | Age: 58
End: 2018-01-23

## 2018-01-23 VITALS
RESPIRATION RATE: 16 BRPM | SYSTOLIC BLOOD PRESSURE: 130 MMHG | WEIGHT: 166.2 LBS | HEIGHT: 63 IN | HEART RATE: 77 BPM | BODY MASS INDEX: 29.45 KG/M2 | OXYGEN SATURATION: 97 % | TEMPERATURE: 98.4 F | DIASTOLIC BLOOD PRESSURE: 70 MMHG

## 2018-01-23 DIAGNOSIS — M15.9 PRIMARY OSTEOARTHRITIS INVOLVING MULTIPLE JOINTS: Primary | ICD-10-CM

## 2018-01-23 DIAGNOSIS — G89.4 CHRONIC PAIN SYNDROME: ICD-10-CM

## 2018-01-23 DIAGNOSIS — J01.00 ACUTE NON-RECURRENT MAXILLARY SINUSITIS: ICD-10-CM

## 2018-01-23 DIAGNOSIS — F41.9 ANXIETY: ICD-10-CM

## 2018-01-23 DIAGNOSIS — J30.1 ACUTE ALLERGIC RHINITIS DUE TO POLLEN, UNSPECIFIED SEASONALITY: ICD-10-CM

## 2018-01-23 PROCEDURE — 99213 OFFICE O/P EST LOW 20 MIN: CPT | Performed by: FAMILY MEDICINE

## 2018-01-23 RX ORDER — CELECOXIB 200 MG/1
200 CAPSULE ORAL DAILY
Qty: 30 CAPSULE | Refills: 5 | Status: SHIPPED | OUTPATIENT
Start: 2018-01-23 | End: 2018-10-30

## 2018-01-23 RX ORDER — LEVOFLOXACIN 500 MG/1
500 TABLET, FILM COATED ORAL DAILY
Qty: 10 TABLET | Refills: 0 | Status: SHIPPED | OUTPATIENT
Start: 2018-01-23 | End: 2018-02-02

## 2018-01-23 NOTE — PROGRESS NOTES
Cc:chronic neck and shoulder pain,R ear pain    Subjective.../HPI  Patient present today with1)chronic pain -neck  3-4 norco /day  2) R ear pain    I have reviewed the patient's medical history in detail and updated the computerized patient record.        No family history on file.    Social History     Social History   • Marital status:      Spouse name: N/A   • Number of children: N/A   • Years of education: N/A     Occupational History   • Not on file.     Social History Main Topics   • Smoking status: Current Every Day Smoker   • Smokeless tobacco: Never Used   • Alcohol use Not on file      Comment: occasional   • Drug use: Yes     Special: Hydrocodone   • Sexual activity: Defer     Other Topics Concern   • Not on file     Social History Narrative       Review of Systems:   Review of Systems   Constitutional: Negative.    HENT: Negative.    Eyes: Negative.    Respiratory: Negative.    Cardiovascular: Negative.    Gastrointestinal: Negative.    Endocrine: Negative.    Genitourinary: Negative.    Musculoskeletal: Negative.    Skin: Negative.    Allergic/Immunologic: Negative.    Neurological: Negative.    Hematological: Negative.    Psychiatric/Behavioral: Negative.          Physical Exam   Constitutional: She is oriented to person, place, and time. She appears well-developed and well-nourished.   Cardiovascular: Normal rate, regular rhythm, normal heart sounds and intact distal pulses.    Pulmonary/Chest: Effort normal and breath sounds normal.   Musculoskeletal:   bilat shoulder tenderness and crepitance   Neurological: She is alert and oriented to person, place, and time.   Skin: Skin is warm and dry.   Psychiatric: She has a normal mood and affect. Her behavior is normal.   Vitals reviewed.        Vital Signs     Vitals:    01/23/18 1503   BP: 130/70   BP Location: Left arm   Patient Position: Sitting   Cuff Size: Large Adult   Pulse: 77   Resp: 16   Temp: 98.4 °F (36.9 °C)   TempSrc: Tympanic   SpO2:  "97%   Weight: 75.4 kg (166 lb 3.2 oz)   Height: 160 cm (63\")          Results Review:      REVIEWED AND DISCUSSED CLINICAL RESULTS WITH PATIENT      Requested Prescriptions     Signed Prescriptions Disp Refills   • celecoxib (CELEBREX) 200 MG capsule 30 capsule 5     Sig: Take 1 capsule by mouth Daily.   • levoFLOXacin (LEVAQUIN) 500 MG tablet 10 tablet 0     Sig: Take 1 tablet by mouth Daily for 10 days.   • mupirocin (BACTROBAN) 2 % ointment 22 g 5     Sig: Apply  topically 3 (Three) Times a Day.         Current Outpatient Prescriptions:   •  ALPRAZolam (XANAX) 0.5 MG tablet, TAKE ONE TABLET BY MOUTH TWICE DAILY, Disp: 60 tablet, Rfl: 0  •  azelastine (ASTELIN) 0.1 % nasal spray, 2 sprays into each nostril 2 (Two) Times a Day. Use in each nostril as directed, Disp: 1 each, Rfl: 12  •  Azelastine-Fluticasone (DYMISTA) 137-50 MCG/ACT suspension, 1 spray into each nostril 2 (Two) Times a Day., Disp: 1 bottle, Rfl: 11  •  carisoprodol (SOMA) 350 MG tablet, Take 1 tablet by mouth 4 (Four) Times a Day As Needed for Muscle Spasms., Disp: 120 tablet, Rfl: 1  •  cefdinir (OMNICEF) 300 MG capsule, Take 1 capsule by mouth 2 (Two) Times a Day., Disp: 20 capsule, Rfl: 0  •  cetirizine (ZyrTEC) 10 MG tablet, Take 10 mg by mouth daily., Disp: , Rfl:   •  DULoxetine (CYMBALTA) 60 MG capsule, Take 2 capsules by mouth Daily., Disp: 60 capsule, Rfl: 5  •  DULoxetine (CYMBALTA) 60 MG capsule, TAKE TWO CAPSULES BY MOUTH ONCE DAILY, Disp: 60 capsule, Rfl: 5  •  halobetasol (ULTRAVATE) 0.05 % cream, APPLY  CREAM TOPICALLY TWICE DAILY, Disp: 50 g, Rfl: 0  •  HYDROcodone-acetaminophen (NORCO) 7.5-325 MG per tablet, Take one tablet by mouth every 4 hours prn mod pain  Indications: EARLY  REQUEST 4/13/16, Disp: 150 tablet, Rfl: 0  •  montelukast (SINGULAIR) 10 MG tablet, Take 1 tablet by mouth Every Night., Disp: 30 tablet, Rfl: 11  •  neomycin-polymyxin-hydrocortisone (CORTISPORIN) 3.5-16851-1 otic solution, Administer 3 drops into " both ears 3 (Three) Times a Day., Disp: 10 mL, Rfl: 0  •  neomycin-polymyxin-hydrocortisone (CORTISPORIN) 3.5-89675-6 otic suspension, , Disp: , Rfl:   •  valACYclovir (VALTREX) 500 MG tablet, TAKE ONE TABLET BY MOUTH TWICE DAILY, Disp: 20 tablet, Rfl: 0  •  celecoxib (CELEBREX) 200 MG capsule, Take 1 capsule by mouth Daily., Disp: 30 capsule, Rfl: 5  •  levoFLOXacin (LEVAQUIN) 500 MG tablet, Take 1 tablet by mouth Daily for 10 days., Disp: 10 tablet, Rfl: 0  •  mupirocin (BACTROBAN) 2 % ointment, Apply  topically 3 (Three) Times a Day., Disp: 22 g, Rfl: 5    Procedures    Assessment/Plan     Diagnoses and all orders for this visit:    Primary osteoarthritis involving multiple joints  -     celecoxib (CELEBREX) 200 MG capsule; Take 1 capsule by mouth Daily.    Chronic pain syndrome    Acute allergic rhinitis due to pollen, unspecified seasonality    Acute non-recurrent maxillary sinusitis  -     levoFLOXacin (LEVAQUIN) 500 MG tablet; Take 1 tablet by mouth Daily for 10 days.    Anxiety    Other orders  -     mupirocin (BACTROBAN) 2 % ointment; Apply  topically 3 (Three) Times a Day.         Return in about 3 months (around 4/23/2018) for Recheck.    Dane Galaviz M.D  01/23/18  3:54 PM

## 2018-02-13 DIAGNOSIS — M15.9 PRIMARY OSTEOARTHRITIS INVOLVING MULTIPLE JOINTS: ICD-10-CM

## 2018-02-13 DIAGNOSIS — G89.4 CHRONIC PAIN SYNDROME: ICD-10-CM

## 2018-02-13 RX ORDER — HYDROCODONE BITARTRATE AND ACETAMINOPHEN 7.5; 325 MG/1; MG/1
TABLET ORAL
Qty: 150 TABLET | Refills: 0 | Status: SHIPPED | OUTPATIENT
Start: 2018-02-13 | End: 2018-03-12 | Stop reason: SDUPTHER

## 2018-02-14 DIAGNOSIS — G89.4 CHRONIC PAIN SYNDROME: ICD-10-CM

## 2018-02-14 DIAGNOSIS — M15.9 PRIMARY OSTEOARTHRITIS INVOLVING MULTIPLE JOINTS: ICD-10-CM

## 2018-02-14 RX ORDER — CARISOPRODOL 350 MG/1
350 TABLET ORAL 4 TIMES DAILY PRN
Qty: 120 TABLET | Refills: 1 | OUTPATIENT
Start: 2018-02-14 | End: 2018-04-12 | Stop reason: SDUPTHER

## 2018-02-23 DIAGNOSIS — M15.9 PRIMARY OSTEOARTHRITIS INVOLVING MULTIPLE JOINTS: ICD-10-CM

## 2018-02-23 DIAGNOSIS — M77.8 TENDONITIS OF ELBOW, RIGHT: Primary | ICD-10-CM

## 2018-03-01 ENCOUNTER — OFFICE VISIT (OUTPATIENT)
Dept: ORTHOPEDIC SURGERY | Facility: CLINIC | Age: 58
End: 2018-03-01

## 2018-03-01 VITALS — HEIGHT: 63 IN | BODY MASS INDEX: 29.84 KG/M2 | WEIGHT: 168.4 LBS | TEMPERATURE: 98.5 F

## 2018-03-01 DIAGNOSIS — M23.92 LOCKING OF LEFT KNEE: ICD-10-CM

## 2018-03-01 DIAGNOSIS — M25.562 ACUTE PAIN OF LEFT KNEE: Primary | ICD-10-CM

## 2018-03-01 PROCEDURE — 73562 X-RAY EXAM OF KNEE 3: CPT | Performed by: ORTHOPAEDIC SURGERY

## 2018-03-01 PROCEDURE — 99203 OFFICE O/P NEW LOW 30 MIN: CPT | Performed by: ORTHOPAEDIC SURGERY

## 2018-03-01 NOTE — PROGRESS NOTES
Patient Name: Marina Sotelo   YOB: 1960  Referring Primary Care Physician: Dane Galaviz MD      Chief Complaint:    Chief Complaint   Patient presents with   • Left Knee - Establish Care, Pain        Subjective:    HPI:   Marina Sotelo is a pleasant 57 y.o. year old who presents today for evaluation of   Chief Complaint   Patient presents with   • Left Knee - Establish Care, Pain   .  KNEE: TIMING:  The pain is described as ACUTE.  LOCATION: medial joint line tenderness. AGGRAVATING FACTORS:  Is worsened by prolonged standing, sitting, walking and squatting activities.  ASSOCIATED SYMPTOMS:  swelling, tenderness, and aching.locks.  celebrex helps    This problem is new to this examiner.     Medications:   Home Medications:  Current Outpatient Prescriptions on File Prior to Visit   Medication Sig   • ALPRAZolam (XANAX) 0.5 MG tablet TAKE ONE TABLET BY MOUTH TWICE DAILY   • carisoprodol (SOMA) 350 MG tablet Take 1 tablet by mouth 4 (Four) Times a Day As Needed for Muscle Spasms.   • celecoxib (CELEBREX) 200 MG capsule Take 1 capsule by mouth Daily.   • cetirizine (ZyrTEC) 10 MG tablet Take 10 mg by mouth daily.   • DULoxetine (CYMBALTA) 60 MG capsule Take 2 capsules by mouth Daily.   • halobetasol (ULTRAVATE) 0.05 % cream APPLY  CREAM TOPICALLY TWICE DAILY   • HYDROcodone-acetaminophen (NORCO) 7.5-325 MG per tablet Take one tablet by mouth every 4 hours prn mod pain  Indications: EARLY  REQUEST 4/13/16   • montelukast (SINGULAIR) 10 MG tablet Take 1 tablet by mouth Every Night.   • mupirocin (BACTROBAN) 2 % ointment Apply  topically 3 (Three) Times a Day.   • valACYclovir (VALTREX) 500 MG tablet TAKE ONE TABLET BY MOUTH TWICE DAILY   • [DISCONTINUED] azelastine (ASTELIN) 0.1 % nasal spray 2 sprays into each nostril 2 (Two) Times a Day. Use in each nostril as directed   • [DISCONTINUED] Azelastine-Fluticasone (DYMISTA) 137-50 MCG/ACT suspension 1 spray into each nostril 2 (Two) Times a Day.    • [DISCONTINUED] cefdinir (OMNICEF) 300 MG capsule Take 1 capsule by mouth 2 (Two) Times a Day.   • [DISCONTINUED] DULoxetine (CYMBALTA) 60 MG capsule TAKE TWO CAPSULES BY MOUTH ONCE DAILY   • [DISCONTINUED] neomycin-polymyxin-hydrocortisone (CORTISPORIN) 3.5-12229-9 otic solution Administer 3 drops into both ears 3 (Three) Times a Day.   • [DISCONTINUED] neomycin-polymyxin-hydrocortisone (CORTISPORIN) 3.5-03799-0 otic suspension      No current facility-administered medications on file prior to visit.      Current Medications:  Scheduled Meds:  Continuous Infusions:  No current facility-administered medications for this visit.   PRN Meds:.    I have reviewed the patient's medical history in detail and updated the computerized patient record.  Review and summarization of old records includes:    Past Medical History:   Diagnosis Date   • Anxiety    • Arthritis    • Chronic pain    • Depression, acute     recurrent   • Fibromyalgia, primary    • Hypertension    • Left hip pain     c/o pain in left hip        Past Surgical History:   Procedure Laterality Date   • TUBAL ABDOMINAL LIGATION          Social History     Occupational History   • Not on file.     Social History Main Topics   • Smoking status: Current Every Day Smoker   • Smokeless tobacco: Never Used   • Alcohol use Yes      Comment: occasional   • Drug use: Yes     Special: Hydrocodone   • Sexual activity: Defer    Social History     Social History Narrative        Family History   Problem Relation Age of Onset   • Cancer Mother      Colon   • Cancer Father      Brain   • Cancer Sister      Pancreatic   • No Known Problems Brother    • No Known Problems Maternal Aunt    • No Known Problems Maternal Uncle    • No Known Problems Paternal Aunt    • No Known Problems Paternal Uncle    • No Known Problems Maternal Grandmother    • No Known Problems Maternal Grandfather    • No Known Problems Paternal Grandmother    • No Known Problems Paternal Grandfather    •  Anesthesia problems Neg Hx    • Broken bones Neg Hx    • Clotting disorder Neg Hx    • Collagen disease Neg Hx    • Diabetes Neg Hx    • Dislocations Neg Hx    • Osteoporosis Neg Hx    • Rheumatologic disease Neg Hx    • Scoliosis Neg Hx    • Severe sprains Neg Hx        ROS: 14 point review of systems was performed and all other systems were reviewed and are negative except for documented findings in HPI and today's encounter.     Allergies:   Allergies   Allergen Reactions   • Penicillins      Constitutional:  Denies fever, shaking or chills   Eyes:  Denies change in visual acuity   HENT:  Denies nasal congestion or sore throat   Respiratory:  Denies cough or shortness of breath   Cardiovascular:  Denies chest pain or severe LE edema   GI:  Denies abdominal pain, nausea, vomiting, bloody stools or diarrhea   Musculoskeletal:  Numbness, tingling, pain, or loss of motor function only as noted above in history of present illness.  : Denies painful urination or hematuria  Integument:  Denies rash, lesion or ulceration   Neurologic:  Denies headache or focal weakness  Endocrine:  Denies lymphadenopathy  Psych:  Denies confusion or change in mental status   Hem:  Denies active bleeding    Objective:    Physical Exam: 57 y.o. female  Body mass index is 29.83 kg/(m^2)., @WT@, @HT@  Vitals:    03/01/18 0838   Temp: 98.5 °F (36.9 °C)     Vital signs reviewed.   General Appearance:    Alert, cooperative, in no acute distress                  Eyes: conjunctiva clear  ENT: external ears and nose atraumatic  CV: no peripheral edema  Resp: normal respiratory effort  Skin: no rashes or wounds; normal turgor  Psych: mood and affect appropriate  Lymph: no nodes appreciated  Neuro: gross sensation intact  Vascular:  Palpable peripheral pulse in noted extremity  Musculoskeletal Extremities: KNEE Exam: medial joint line tenderness with crepitation, synovitis, swelling, and joint effusion left knee. mcmurrays hurts    Radiology:    .[id number]     Imaging done today and discussed at length with the patient:    Indication: pain related symptoms,  Views: 3V AP, LAT & 40 degree PA left knee(s)   Findings: moderate arthritis, PF changes  Comparison views: not available      Assessment:     ICD-10-CM ICD-9-CM   1. Acute pain of left knee M25.562 719.46   2. Locking of left knee M23.92 717.9        Procedures       Plan: Biomechanics of pertinent body area discussed.  Risks, benefits, alternatives, comparisons, and complications of accepted medicines, injections, recommendations, surgical procedures, and therapies explained and education provided in laymen's terms. The patient was given the opportunity to ask questions and they were answerved to their satisfaction.   Natural history and expected course of this patient's diagnosis discussed along with evaluation of therapies. Questions answered.  Cryotherapy/brachy therapy as indicated with instructions.   MRI.  Rest, ice, compression, and elevation (RICE) therapy.      3/1/2018

## 2018-03-09 ENCOUNTER — HOSPITAL ENCOUNTER (OUTPATIENT)
Dept: MRI IMAGING | Facility: HOSPITAL | Age: 58
Discharge: HOME | End: 2018-03-09
Attending: ORTHOPAEDIC SURGERY | Admitting: ORTHOPAEDIC SURGERY

## 2018-03-09 DIAGNOSIS — M25.562 ACUTE PAIN OF LEFT KNEE: ICD-10-CM

## 2018-03-09 DIAGNOSIS — M23.92 LOCKING OF LEFT KNEE: ICD-10-CM

## 2018-03-09 PROCEDURE — 73721 MRI JNT OF LWR EXTRE W/O DYE: CPT

## 2018-03-12 ENCOUNTER — OFFICE VISIT (OUTPATIENT)
Dept: ORTHOPEDIC SURGERY | Facility: CLINIC | Age: 58
End: 2018-03-12

## 2018-03-12 VITALS — BODY MASS INDEX: 30.02 KG/M2 | HEIGHT: 63 IN | WEIGHT: 169.4 LBS | TEMPERATURE: 98 F

## 2018-03-12 DIAGNOSIS — G89.4 CHRONIC PAIN SYNDROME: ICD-10-CM

## 2018-03-12 DIAGNOSIS — M15.9 PRIMARY OSTEOARTHRITIS INVOLVING MULTIPLE JOINTS: ICD-10-CM

## 2018-03-12 DIAGNOSIS — S83.242A ACUTE MEDIAL MENISCUS TEAR OF LEFT KNEE, INITIAL ENCOUNTER: Primary | ICD-10-CM

## 2018-03-12 PROCEDURE — 99213 OFFICE O/P EST LOW 20 MIN: CPT | Performed by: ORTHOPAEDIC SURGERY

## 2018-03-12 RX ORDER — HYDROCODONE BITARTRATE AND ACETAMINOPHEN 7.5; 325 MG/1; MG/1
TABLET ORAL
Qty: 150 TABLET | Refills: 0 | Status: SHIPPED | OUTPATIENT
Start: 2018-03-12 | End: 2018-04-09 | Stop reason: SDUPTHER

## 2018-03-12 NOTE — PROGRESS NOTES
Patient Name: Marina Sotelo   YOB: 1960  Referring Primary Care Physician: Dane Galaviz MD      Chief Complaint:    Chief Complaint   Patient presents with   • Left Knee - Follow-up, Pain        Subjective:    HPI:   Marina Sotelo is a pleasant 57 y.o. year old who presents today for evaluation of   Chief Complaint   Patient presents with   • Left Knee - Follow-up, Pain   .fu on left knee mri.  Knee is better with linaments.  Not locking  A little sore.  Cleans houses.  Reviewed mri that shows mmt and some oa.  Reg xrays with mod oa.  Went over surgery, risks benefits and wants to hold 'can live with it' and doesn't want to take time off work to have scope.  May want injections.      This problem is not new to this examiner.     Medications:   Home Medications:  Current Outpatient Prescriptions on File Prior to Visit   Medication Sig   • ALPRAZolam (XANAX) 0.5 MG tablet TAKE ONE TABLET BY MOUTH TWICE DAILY   • carisoprodol (SOMA) 350 MG tablet Take 1 tablet by mouth 4 (Four) Times a Day As Needed for Muscle Spasms.   • celecoxib (CELEBREX) 200 MG capsule Take 1 capsule by mouth Daily.   • cetirizine (ZyrTEC) 10 MG tablet Take 10 mg by mouth daily.   • DULoxetine (CYMBALTA) 60 MG capsule Take 2 capsules by mouth Daily.   • halobetasol (ULTRAVATE) 0.05 % cream APPLY  CREAM TOPICALLY TWICE DAILY   • HYDROcodone-acetaminophen (NORCO) 7.5-325 MG per tablet Take one tablet by mouth every 4 hours prn mod pain  Indications: EARLY  REQUEST 4/13/16   • montelukast (SINGULAIR) 10 MG tablet Take 1 tablet by mouth Every Night.   • mupirocin (BACTROBAN) 2 % ointment Apply  topically 3 (Three) Times a Day.   • valACYclovir (VALTREX) 500 MG tablet TAKE ONE TABLET BY MOUTH TWICE DAILY     No current facility-administered medications on file prior to visit.      Current Medications:  Scheduled Meds:  Continuous Infusions:  No current facility-administered medications for this visit.   PRN Meds:.    I  have reviewed the patient's medical history in detail and updated the computerized patient record.  Review and summarization of old records includes:    Past Medical History:   Diagnosis Date   • Anxiety    • Arthritis    • Chronic pain    • Depression, acute     recurrent   • Fibromyalgia, primary    • Hypertension    • Left hip pain     c/o pain in left hip        Past Surgical History:   Procedure Laterality Date   • TUBAL ABDOMINAL LIGATION          Social History     Occupational History   • Not on file.     Social History Main Topics   • Smoking status: Current Every Day Smoker   • Smokeless tobacco: Never Used   • Alcohol use Yes      Comment: occasional   • Drug use:      Types: Hydrocodone   • Sexual activity: Defer    Social History     Social History Narrative   • No narrative on file        Family History   Problem Relation Age of Onset   • Cancer Mother      Colon   • Cancer Father      Brain   • Cancer Sister      Pancreatic   • No Known Problems Brother    • No Known Problems Maternal Aunt    • No Known Problems Maternal Uncle    • No Known Problems Paternal Aunt    • No Known Problems Paternal Uncle    • No Known Problems Maternal Grandmother    • No Known Problems Maternal Grandfather    • No Known Problems Paternal Grandmother    • No Known Problems Paternal Grandfather    • Anesthesia problems Neg Hx    • Broken bones Neg Hx    • Clotting disorder Neg Hx    • Collagen disease Neg Hx    • Diabetes Neg Hx    • Dislocations Neg Hx    • Osteoporosis Neg Hx    • Rheumatologic disease Neg Hx    • Scoliosis Neg Hx    • Severe sprains Neg Hx        ROS: 14 point review of systems was performed and all other systems were reviewed and are negative except for documented findings in HPI and today's encounter.     Allergies:   Allergies   Allergen Reactions   • Penicillins      Constitutional:  Denies fever, shaking or chills   Eyes:  Denies change in visual acuity   HENT:  Denies nasal congestion or sore  throat   Respiratory:  Denies cough or shortness of breath   Cardiovascular:  Denies chest pain or severe LE edema   GI:  Denies abdominal pain, nausea, vomiting, bloody stools or diarrhea   Musculoskeletal:  Numbness, tingling, pain, or loss of motor function only as noted above in history of present illness.  : Denies painful urination or hematuria  Integument:  Denies rash, lesion or ulceration   Neurologic:  Denies headache or focal weakness  Endocrine:  Denies lymphadenopathy  Psych:  Denies confusion or change in mental status   Hem:  Denies active bleeding  [id number]   mri reviewed that shows medial meniscal tear and some arthritis.  Left knee with some swelling, slight post med tenderness.  mcm a little sore but no click,  Plan: Biomechanics of pertinent body area discussed.  Risks, benefits, alternatives, comparisons, and complications of accepted medicines, injections, recommendations, surgical procedures, and therapies explained and education provided in laymen's terms. The patient was given the opportunity to ask questions and they were answerved to their satisfaction.   Natural history and expected course of this patient's diagnosis discussed along with evaluation of therapies. Questions answered.  Rest, ice, compression, and elevation (RICE) therapy.  Call if wants to do her left knee scope or injection.    3/12/2018

## 2018-03-20 RX ORDER — ALPRAZOLAM 0.5 MG/1
0.5 TABLET ORAL 2 TIMES DAILY
Qty: 60 TABLET | Refills: 0 | OUTPATIENT
Start: 2018-03-20 | End: 2018-04-24

## 2018-04-09 DIAGNOSIS — G89.4 CHRONIC PAIN SYNDROME: ICD-10-CM

## 2018-04-09 DIAGNOSIS — M15.9 PRIMARY OSTEOARTHRITIS INVOLVING MULTIPLE JOINTS: ICD-10-CM

## 2018-04-10 RX ORDER — HYDROCODONE BITARTRATE AND ACETAMINOPHEN 7.5; 325 MG/1; MG/1
TABLET ORAL
Qty: 150 TABLET | Refills: 0 | Status: SHIPPED | OUTPATIENT
Start: 2018-04-10 | End: 2018-04-24

## 2018-04-12 DIAGNOSIS — M15.9 PRIMARY OSTEOARTHRITIS INVOLVING MULTIPLE JOINTS: ICD-10-CM

## 2018-04-12 DIAGNOSIS — G89.4 CHRONIC PAIN SYNDROME: ICD-10-CM

## 2018-04-12 RX ORDER — DULOXETIN HYDROCHLORIDE 60 MG/1
CAPSULE, DELAYED RELEASE ORAL
Qty: 60 CAPSULE | Refills: 0 | Status: SHIPPED | OUTPATIENT
Start: 2018-04-12 | End: 2018-04-24 | Stop reason: SDUPTHER

## 2018-04-12 RX ORDER — CARISOPRODOL 350 MG/1
TABLET ORAL
Qty: 120 TABLET | Refills: 1 | Status: SHIPPED | OUTPATIENT
Start: 2018-04-12 | End: 2018-06-05 | Stop reason: SDUPTHER

## 2018-04-24 ENCOUNTER — OFFICE VISIT (OUTPATIENT)
Dept: INTERNAL MEDICINE | Facility: CLINIC | Age: 58
End: 2018-04-24

## 2018-04-24 VITALS
RESPIRATION RATE: 16 BRPM | OXYGEN SATURATION: 97 % | DIASTOLIC BLOOD PRESSURE: 82 MMHG | WEIGHT: 172 LBS | SYSTOLIC BLOOD PRESSURE: 140 MMHG | HEART RATE: 70 BPM | HEIGHT: 63 IN | BODY MASS INDEX: 30.48 KG/M2 | TEMPERATURE: 97.6 F

## 2018-04-24 DIAGNOSIS — N30.00 ACUTE CYSTITIS WITHOUT HEMATURIA: ICD-10-CM

## 2018-04-24 DIAGNOSIS — M15.9 PRIMARY OSTEOARTHRITIS INVOLVING MULTIPLE JOINTS: Primary | ICD-10-CM

## 2018-04-24 DIAGNOSIS — G89.4 CHRONIC PAIN SYNDROME: ICD-10-CM

## 2018-04-24 DIAGNOSIS — Z79.899 ENCOUNTER FOR LONG-TERM (CURRENT) DRUG USE: ICD-10-CM

## 2018-04-24 LAB
BILIRUB BLD-MCNC: NEGATIVE MG/DL
CLARITY, POC: CLEAR
COLOR UR: YELLOW
GLUCOSE UR STRIP-MCNC: NEGATIVE MG/DL
KETONES UR QL: NEGATIVE
LEUKOCYTE EST, POC: NEGATIVE
NITRITE UR-MCNC: NEGATIVE MG/ML
PH UR: 5 [PH] (ref 5–8)
PROT UR STRIP-MCNC: NEGATIVE MG/DL
RBC # UR STRIP: NEGATIVE /UL
SP GR UR: 1.01 (ref 1–1.03)
UROBILINOGEN UR QL: NORMAL

## 2018-04-24 PROCEDURE — 99213 OFFICE O/P EST LOW 20 MIN: CPT | Performed by: FAMILY MEDICINE

## 2018-04-24 PROCEDURE — 81003 URINALYSIS AUTO W/O SCOPE: CPT | Performed by: FAMILY MEDICINE

## 2018-04-24 RX ORDER — AMOXICILLIN 500 MG/1
1000 CAPSULE ORAL 3 TIMES DAILY
Qty: 30 CAPSULE | Refills: 0 | Status: SHIPPED | OUTPATIENT
Start: 2018-04-24 | End: 2018-07-24

## 2018-04-24 RX ORDER — HYDROCODONE BITARTRATE AND ACETAMINOPHEN 7.5; 325 MG/1; MG/1
TABLET ORAL
Qty: 150 TABLET | Refills: 0 | Status: SHIPPED | OUTPATIENT
Start: 2018-04-24 | End: 2018-06-08 | Stop reason: SDUPTHER

## 2018-04-24 RX ORDER — ALPRAZOLAM 0.5 MG/1
0.5 TABLET ORAL 2 TIMES DAILY
Qty: 60 TABLET | Refills: 0 | Status: SHIPPED | OUTPATIENT
Start: 2018-04-24 | End: 2018-06-22 | Stop reason: SDUPTHER

## 2018-04-24 NOTE — PROGRESS NOTES
CC:Pain,depression/anxiety,arthritis    Subjective.../HPI  Patient present today with    I have reviewed the patient's medical history in detail and updated the computerized patient record.    Past Medical History:   Diagnosis Date   • Anxiety    • Arthritis    • Chronic pain    • Depression, acute     recurrent   • Fibromyalgia, primary    • Hypertension    • Left hip pain     c/o pain in left hip       Past Surgical History:   Procedure Laterality Date   • TUBAL ABDOMINAL LIGATION         Family History   Problem Relation Age of Onset   • Cancer Mother      Colon   • Cancer Father      Brain   • Cancer Sister      Pancreatic   • No Known Problems Brother    • No Known Problems Maternal Aunt    • No Known Problems Maternal Uncle    • No Known Problems Paternal Aunt    • No Known Problems Paternal Uncle    • No Known Problems Maternal Grandmother    • No Known Problems Maternal Grandfather    • No Known Problems Paternal Grandmother    • No Known Problems Paternal Grandfather    • Anesthesia problems Neg Hx    • Broken bones Neg Hx    • Clotting disorder Neg Hx    • Collagen disease Neg Hx    • Diabetes Neg Hx    • Dislocations Neg Hx    • Osteoporosis Neg Hx    • Rheumatologic disease Neg Hx    • Scoliosis Neg Hx    • Severe sprains Neg Hx        Social History     Social History   • Marital status:      Spouse name: N/A   • Number of children: N/A   • Years of education: N/A     Occupational History   • Not on file.     Social History Main Topics   • Smoking status: Current Every Day Smoker   • Smokeless tobacco: Never Used   • Alcohol use Yes      Comment: occasional   • Drug use:      Types: Hydrocodone   • Sexual activity: Defer     Other Topics Concern   • Not on file     Social History Narrative   • No narrative on file       Most Recent Immunizations   Administered Date(s) Administered   • Flu Vaccine Quad PF >36MO 10/09/2017   • Influenza TIV (IM) 10/28/2016   • Influenza, Unspecified 09/25/2015   •  "Tdap 01/28/2010       Review of Systems:   Review of Systems   Constitutional: Negative.    HENT: Negative.    Eyes: Negative.    Respiratory: Negative.    Cardiovascular: Negative.    Gastrointestinal: Negative.    Endocrine: Negative.    Genitourinary: Negative.    Musculoskeletal: Negative.    Skin: Negative.    Allergic/Immunologic: Negative.    Neurological: Negative.    Hematological: Negative.    Psychiatric/Behavioral: Negative.          Physical Exam   Constitutional: She is oriented to person, place, and time. She appears well-developed.   HENT:   Both jaws swollen( recent tooth extraction   Cardiovascular: Normal rate and regular rhythm.    Pulmonary/Chest: Effort normal and breath sounds normal.   Musculoskeletal: Normal range of motion.   Neurological: She is alert and oriented to person, place, and time.   Psychiatric: She has a normal mood and affect. Her behavior is normal.   Vitals reviewed.        Vital Signs     Vitals:    04/24/18 1349   BP: 140/82   BP Location: Left arm   Patient Position: Sitting   Cuff Size: Small Adult   Pulse: 70   Resp: 16   Temp: 97.6 °F (36.4 °C)   TempSrc: Oral   SpO2: 97%   Weight: 78 kg (172 lb)   Height: 160 cm (63\")          Results Review:      REVIEWED AND DISCUSSED CLINICAL RESULTS WITH PATIENT      Requested Prescriptions     Signed Prescriptions Disp Refills   • HYDROcodone-acetaminophen (NORCO) 7.5-325 MG per tablet 150 tablet 0     Sig: Take one tablet by mouth every 4 hours prn mod pain    May fill 5-11-18   • ALPRAZolam (XANAX) 0.5 MG tablet 60 tablet 0     Sig: Take 1 tablet by mouth 2 (Two) Times a Day.         Current Outpatient Prescriptions:   •  ALPRAZolam (XANAX) 0.5 MG tablet, Take 1 tablet by mouth 2 (Two) Times a Day., Disp: 60 tablet, Rfl: 0  •  carisoprodol (SOMA) 350 MG tablet, TAKE 1 TABLET BY MOUTH 4 TIMES DAILY AS NEEDED FOR MUSCLE SPASM, Disp: 120 tablet, Rfl: 1  •  celecoxib (CELEBREX) 200 MG capsule, Take 1 capsule by mouth Daily., Disp: " 30 capsule, Rfl: 5  •  cetirizine (ZyrTEC) 10 MG tablet, Take 10 mg by mouth daily., Disp: , Rfl:   •  DULoxetine (CYMBALTA) 60 MG capsule, Take 2 capsules by mouth Daily., Disp: 60 capsule, Rfl: 5  •  halobetasol (ULTRAVATE) 0.05 % cream, APPLY  CREAM TOPICALLY TWICE DAILY, Disp: 50 g, Rfl: 0  •  HYDROcodone-acetaminophen (NORCO) 7.5-325 MG per tablet, Take one tablet by mouth every 4 hours prn mod pain  May fill 5-11-18, Disp: 150 tablet, Rfl: 0  •  montelukast (SINGULAIR) 10 MG tablet, Take 1 tablet by mouth Every Night., Disp: 30 tablet, Rfl: 11  •  mupirocin (BACTROBAN) 2 % ointment, Apply  topically 3 (Three) Times a Day., Disp: 22 g, Rfl: 5  •  valACYclovir (VALTREX) 500 MG tablet, TAKE ONE TABLET BY MOUTH TWICE DAILY, Disp: 20 tablet, Rfl: 0    Procedures          Diagnoses and all orders for this visit:    Primary osteoarthritis involving multiple joints  -     HYDROcodone-acetaminophen (NORCO) 7.5-325 MG per tablet; Take one tablet by mouth every 4 hours prn mod pain    May fill 5-11-18    Chronic pain syndrome  -     HYDROcodone-acetaminophen (NORCO) 7.5-325 MG per tablet; Take one tablet by mouth every 4 hours prn mod pain    May fill 5-11-18    Encounter for long-term (current) drug use    Other orders  -     POCT urinalysis dipstick, automated  -     ALPRAZolam (XANAX) 0.5 MG tablet; Take 1 tablet by mouth 2 (Two) Times a Day.         Return in about 3 months (around 7/24/2018) for Recheck.    Dane Galaviz M.D  04/24/18  2:54 PM

## 2018-04-29 ENCOUNTER — RESULTS ENCOUNTER (OUTPATIENT)
Dept: INTERNAL MEDICINE | Facility: CLINIC | Age: 58
End: 2018-04-29

## 2018-04-29 DIAGNOSIS — Z79.899 ENCOUNTER FOR LONG-TERM (CURRENT) DRUG USE: ICD-10-CM

## 2018-04-29 DIAGNOSIS — M15.9 PRIMARY OSTEOARTHRITIS INVOLVING MULTIPLE JOINTS: ICD-10-CM

## 2018-04-29 DIAGNOSIS — G89.4 CHRONIC PAIN SYNDROME: ICD-10-CM

## 2018-05-15 RX ORDER — VALACYCLOVIR HYDROCHLORIDE 500 MG/1
TABLET, FILM COATED ORAL
Qty: 20 TABLET | Refills: 0 | Status: SHIPPED | OUTPATIENT
Start: 2018-05-15 | End: 2018-07-19 | Stop reason: SDUPTHER

## 2018-05-15 RX ORDER — DULOXETIN HYDROCHLORIDE 60 MG/1
CAPSULE, DELAYED RELEASE ORAL
Qty: 60 CAPSULE | Refills: 0 | Status: SHIPPED | OUTPATIENT
Start: 2018-05-15 | End: 2018-06-09 | Stop reason: SDUPTHER

## 2018-05-16 ENCOUNTER — TELEPHONE (OUTPATIENT)
Dept: ORTHOPEDIC SURGERY | Facility: CLINIC | Age: 58
End: 2018-05-16

## 2018-06-05 DIAGNOSIS — M15.9 PRIMARY OSTEOARTHRITIS INVOLVING MULTIPLE JOINTS: ICD-10-CM

## 2018-06-05 DIAGNOSIS — G89.4 CHRONIC PAIN SYNDROME: ICD-10-CM

## 2018-06-06 DIAGNOSIS — G89.4 CHRONIC PAIN SYNDROME: ICD-10-CM

## 2018-06-06 DIAGNOSIS — M15.9 PRIMARY OSTEOARTHRITIS INVOLVING MULTIPLE JOINTS: ICD-10-CM

## 2018-06-06 RX ORDER — HYDROCODONE BITARTRATE AND ACETAMINOPHEN 7.5; 325 MG/1; MG/1
TABLET ORAL
Qty: 150 TABLET | Refills: 0 | OUTPATIENT
Start: 2018-06-06

## 2018-06-06 RX ORDER — CARISOPRODOL 350 MG/1
TABLET ORAL
Qty: 120 TABLET | Refills: 1 | OUTPATIENT
Start: 2018-06-06 | End: 2018-07-31 | Stop reason: SDUPTHER

## 2018-06-08 DIAGNOSIS — G89.4 CHRONIC PAIN SYNDROME: ICD-10-CM

## 2018-06-08 DIAGNOSIS — M15.9 PRIMARY OSTEOARTHRITIS INVOLVING MULTIPLE JOINTS: ICD-10-CM

## 2018-06-08 RX ORDER — HYDROCODONE BITARTRATE AND ACETAMINOPHEN 7.5; 325 MG/1; MG/1
TABLET ORAL
Qty: 150 TABLET | Refills: 0 | Status: SHIPPED | OUTPATIENT
Start: 2018-06-08 | End: 2018-07-03 | Stop reason: SDUPTHER

## 2018-06-11 RX ORDER — DULOXETIN HYDROCHLORIDE 60 MG/1
CAPSULE, DELAYED RELEASE ORAL
Qty: 60 CAPSULE | Refills: 0 | Status: SHIPPED | OUTPATIENT
Start: 2018-06-11 | End: 2018-07-10 | Stop reason: SDUPTHER

## 2018-06-26 RX ORDER — ALPRAZOLAM 0.5 MG/1
TABLET ORAL
Qty: 60 TABLET | Refills: 3 | OUTPATIENT
Start: 2018-06-26 | End: 2018-10-30 | Stop reason: SDUPTHER

## 2018-07-03 DIAGNOSIS — M15.9 PRIMARY OSTEOARTHRITIS INVOLVING MULTIPLE JOINTS: ICD-10-CM

## 2018-07-03 DIAGNOSIS — G89.4 CHRONIC PAIN SYNDROME: ICD-10-CM

## 2018-07-03 RX ORDER — HYDROCODONE BITARTRATE AND ACETAMINOPHEN 7.5; 325 MG/1; MG/1
TABLET ORAL
Qty: 150 TABLET | Refills: 0 | Status: SHIPPED | OUTPATIENT
Start: 2018-07-03 | End: 2018-08-02 | Stop reason: SDUPTHER

## 2018-07-10 RX ORDER — DULOXETIN HYDROCHLORIDE 60 MG/1
CAPSULE, DELAYED RELEASE ORAL
Qty: 60 CAPSULE | Refills: 0 | Status: SHIPPED | OUTPATIENT
Start: 2018-07-10 | End: 2018-07-24 | Stop reason: SDUPTHER

## 2018-07-19 LAB
ALBUMIN SERPL-MCNC: 4.3 G/DL (ref 3.5–5.2)
ALBUMIN/GLOB SERPL: 1.8 G/DL
ALP SERPL-CCNC: 75 U/L (ref 39–117)
ALT SERPL-CCNC: 19 U/L (ref 1–33)
AST SERPL-CCNC: 15 U/L (ref 1–32)
BASOPHILS # BLD AUTO: 0.03 10*3/MM3 (ref 0–0.2)
BASOPHILS NFR BLD AUTO: 0.3 % (ref 0–1.5)
BILIRUB SERPL-MCNC: 0.2 MG/DL (ref 0.1–1.2)
BUN SERPL-MCNC: 15 MG/DL (ref 6–20)
BUN/CREAT SERPL: 16.3 (ref 7–25)
CALCIUM SERPL-MCNC: 9.2 MG/DL (ref 8.6–10.5)
CHLORIDE SERPL-SCNC: 100 MMOL/L (ref 98–107)
CO2 SERPL-SCNC: 25.8 MMOL/L (ref 22–29)
CREAT SERPL-MCNC: 0.92 MG/DL (ref 0.57–1)
EOSINOPHIL # BLD AUTO: 0.13 10*3/MM3 (ref 0–0.7)
EOSINOPHIL NFR BLD AUTO: 1.4 % (ref 0.3–6.2)
ERYTHROCYTE [DISTWIDTH] IN BLOOD BY AUTOMATED COUNT: 14.2 % (ref 11.7–13)
GLOBULIN SER CALC-MCNC: 2.4 GM/DL
GLUCOSE SERPL-MCNC: 162 MG/DL (ref 65–99)
HCT VFR BLD AUTO: 41.3 % (ref 35.6–45.5)
HGB BLD-MCNC: 13.1 G/DL (ref 11.9–15.5)
IMM GRANULOCYTES # BLD: 0.03 10*3/MM3 (ref 0–0.03)
IMM GRANULOCYTES NFR BLD: 0.3 % (ref 0–0.5)
LYMPHOCYTES # BLD AUTO: 1.8 10*3/MM3 (ref 0.9–4.8)
LYMPHOCYTES NFR BLD AUTO: 18.9 % (ref 19.6–45.3)
MCH RBC QN AUTO: 30.9 PG (ref 26.9–32)
MCHC RBC AUTO-ENTMCNC: 31.7 G/DL (ref 32.4–36.3)
MCV RBC AUTO: 97.4 FL (ref 80.5–98.2)
MONOCYTES # BLD AUTO: 0.61 10*3/MM3 (ref 0.2–1.2)
MONOCYTES NFR BLD AUTO: 6.4 % (ref 5–12)
NEUTROPHILS # BLD AUTO: 6.92 10*3/MM3 (ref 1.9–8.1)
NEUTROPHILS NFR BLD AUTO: 72.7 % (ref 42.7–76)
PLATELET # BLD AUTO: 323 10*3/MM3 (ref 140–500)
POTASSIUM SERPL-SCNC: 4.5 MMOL/L (ref 3.5–5.2)
PROT SERPL-MCNC: 6.7 G/DL (ref 6–8.5)
RBC # BLD AUTO: 4.24 10*6/MM3 (ref 3.9–5.2)
SODIUM SERPL-SCNC: 138 MMOL/L (ref 136–145)
WBC # BLD AUTO: 9.52 10*3/MM3 (ref 4.5–10.7)

## 2018-07-19 RX ORDER — VALACYCLOVIR HYDROCHLORIDE 500 MG/1
TABLET, FILM COATED ORAL
Qty: 20 TABLET | Refills: 0 | Status: SHIPPED | OUTPATIENT
Start: 2018-07-19 | End: 2018-12-26 | Stop reason: SDUPTHER

## 2018-07-24 ENCOUNTER — OFFICE VISIT (OUTPATIENT)
Dept: INTERNAL MEDICINE | Facility: CLINIC | Age: 58
End: 2018-07-24

## 2018-07-24 VITALS
DIASTOLIC BLOOD PRESSURE: 67 MMHG | HEIGHT: 63 IN | TEMPERATURE: 98.4 F | SYSTOLIC BLOOD PRESSURE: 149 MMHG | RESPIRATION RATE: 16 BRPM | BODY MASS INDEX: 29.59 KG/M2 | OXYGEN SATURATION: 98 % | WEIGHT: 167 LBS | HEART RATE: 76 BPM

## 2018-07-24 DIAGNOSIS — M15.9 PRIMARY OSTEOARTHRITIS INVOLVING MULTIPLE JOINTS: Primary | ICD-10-CM

## 2018-07-24 DIAGNOSIS — G89.4 CHRONIC PAIN SYNDROME: ICD-10-CM

## 2018-07-24 PROCEDURE — 99213 OFFICE O/P EST LOW 20 MIN: CPT | Performed by: FAMILY MEDICINE

## 2018-07-24 NOTE — PROGRESS NOTES
CC:arthritis,depression,Pain    Subjective.../HPI  Patient present today with1) arthritis- off meds -no diffewrence  2)depression- happy with cymbalta 3) pain for arthritis- takes 4 Norco/day  4) smoking [id number] interest in quitting  I have reviewed the patient's medical history in detail and updated the computerized patient record.    Past Medical History:   Diagnosis Date   • Anxiety    • Arthritis    • Chronic pain    • Depression, acute     recurrent   • Fibromyalgia, primary    • Hypertension    • Left hip pain     c/o pain in left hip       Past Surgical History:   Procedure Laterality Date   • TUBAL ABDOMINAL LIGATION         Family History   Problem Relation Age of Onset   • Cancer Mother         Colon   • Cancer Father         Brain   • Cancer Sister         Pancreatic   • No Known Problems Brother    • No Known Problems Maternal Aunt    • No Known Problems Maternal Uncle    • No Known Problems Paternal Aunt    • No Known Problems Paternal Uncle    • No Known Problems Maternal Grandmother    • No Known Problems Maternal Grandfather    • No Known Problems Paternal Grandmother    • No Known Problems Paternal Grandfather    • Anesthesia problems Neg Hx    • Broken bones Neg Hx    • Clotting disorder Neg Hx    • Collagen disease Neg Hx    • Diabetes Neg Hx    • Dislocations Neg Hx    • Osteoporosis Neg Hx    • Rheumatologic disease Neg Hx    • Scoliosis Neg Hx    • Severe sprains Neg Hx        Social History     Social History   • Marital status:      Spouse name: N/A   • Number of children: N/A   • Years of education: N/A     Occupational History   • Not on file.     Social History Main Topics   • Smoking status: Current Every Day Smoker   • Smokeless tobacco: Never Used   • Alcohol use Yes      Comment: occasional   • Drug use: Yes     Types: Hydrocodone   • Sexual activity: Defer     Other Topics Concern   • Not on file     Social History Narrative   • No narrative on file       Most Recent  "Immunizations   Administered Date(s) Administered   • Flu Vaccine Quad PF >36MO 10/09/2017   • Influenza TIV (IM) 10/28/2016   • Influenza, Unspecified 09/25/2015   • Tdap 01/28/2010       Review of Systems:   Review of Systems   Constitutional: Negative.    HENT: Negative.    Eyes: Negative.    Respiratory: Negative.    Cardiovascular: Negative.    Gastrointestinal: Negative.    Endocrine: Negative.    Genitourinary: Negative.    Musculoskeletal: Negative.    Skin: Negative.    Allergic/Immunologic: Negative.    Neurological: Negative.    Hematological: Negative.    Psychiatric/Behavioral: Negative.          Physical Exam   Constitutional: She is oriented to person, place, and time. She appears well-developed and well-nourished.   Cardiovascular: Normal rate, regular rhythm and normal heart sounds.    Pulmonary/Chest: Effort normal and breath sounds normal.   Neurological: She is alert and oriented to person, place, and time.   Psychiatric: She has a normal mood and affect. Her behavior is normal.   Vitals reviewed.        Vital Signs     Vitals:    07/24/18 1642   BP: 149/67   BP Location: Left arm   Patient Position: Sitting   Cuff Size: Adult   Pulse: 76   Resp: 16   Temp: 98.4 °F (36.9 °C)   TempSrc: Oral   SpO2: 98%   Weight: 75.8 kg (167 lb)   Height: 160 cm (63\")          Results Review:      REVIEWED AND DISCUSSED CLINICAL RESULTS WITH PATIENT      Requested Prescriptions      No prescriptions requested or ordered in this encounter         Current Outpatient Prescriptions:   •  ALPRAZolam (XANAX) 0.5 MG tablet, TAKE ONE TABLET BY MOUTH TWICE DAILY, Disp: 60 tablet, Rfl: 3  •  carisoprodol (SOMA) 350 MG tablet, TAKE 1 TABLET BY MOUTH 4 TIMES DAILY AS NEEDED FOR MUSCLE SPASM, Disp: 120 tablet, Rfl: 1  •  celecoxib (CELEBREX) 200 MG capsule, Take 1 capsule by mouth Daily., Disp: 30 capsule, Rfl: 5  •  cetirizine (ZyrTEC) 10 MG tablet, Take 10 mg by mouth daily., Disp: , Rfl:   •  DULoxetine (CYMBALTA) 60 MG " capsule, Take 2 capsules by mouth Daily., Disp: 60 capsule, Rfl: 5  •  halobetasol (ULTRAVATE) 0.05 % cream, APPLY  CREAM TOPICALLY TWICE DAILY, Disp: 50 g, Rfl: 0  •  HYDROcodone-acetaminophen (NORCO) 7.5-325 MG per tablet, Take one tablet by mouth every 4 hours prn for pain, Disp: 150 tablet, Rfl: 0  •  montelukast (SINGULAIR) 10 MG tablet, Take 1 tablet by mouth Every Night., Disp: 30 tablet, Rfl: 11  •  mupirocin (BACTROBAN) 2 % ointment, Apply  topically 3 (Three) Times a Day., Disp: 22 g, Rfl: 5  •  valACYclovir (VALTREX) 500 MG tablet, TAKE 1 TABLET BY MOUTH TWICE DAILY, Disp: 20 tablet, Rfl: 0    Procedures          Diagnoses and all orders for this visit:    Primary osteoarthritis involving multiple joints    Chronic pain syndrome         Return in about 3 months (around 10/24/2018) for Recheck.    Dane Galaviz M.D  07/24/18  5:34 PM

## 2018-07-25 LAB
Lab: NORMAL
Lab: NORMAL

## 2018-07-26 ENCOUNTER — TELEPHONE (OUTPATIENT)
Dept: INTERNAL MEDICINE | Facility: CLINIC | Age: 58
End: 2018-07-26

## 2018-07-26 LAB
HBA1C MFR BLD: 5.8 % (ref 4.8–5.6)
WRITTEN AUTHORIZATION: NORMAL

## 2018-07-31 DIAGNOSIS — M15.9 PRIMARY OSTEOARTHRITIS INVOLVING MULTIPLE JOINTS: ICD-10-CM

## 2018-07-31 DIAGNOSIS — G89.4 CHRONIC PAIN SYNDROME: ICD-10-CM

## 2018-07-31 RX ORDER — CARISOPRODOL 350 MG/1
350 TABLET ORAL 4 TIMES DAILY PRN
Qty: 120 TABLET | Refills: 2 | Status: SHIPPED | OUTPATIENT
Start: 2018-07-31 | End: 2018-10-23 | Stop reason: SDUPTHER

## 2018-08-02 DIAGNOSIS — M15.9 PRIMARY OSTEOARTHRITIS INVOLVING MULTIPLE JOINTS: ICD-10-CM

## 2018-08-02 DIAGNOSIS — G89.4 CHRONIC PAIN SYNDROME: ICD-10-CM

## 2018-08-02 RX ORDER — HYDROCODONE BITARTRATE AND ACETAMINOPHEN 7.5; 325 MG/1; MG/1
TABLET ORAL
Qty: 150 TABLET | Refills: 0 | Status: SHIPPED | OUTPATIENT
Start: 2018-08-02 | End: 2018-08-29 | Stop reason: SDUPTHER

## 2018-08-09 DIAGNOSIS — J30.1 ACUTE SEASONAL ALLERGIC RHINITIS DUE TO POLLEN: ICD-10-CM

## 2018-08-09 RX ORDER — MONTELUKAST SODIUM 10 MG/1
10 TABLET ORAL NIGHTLY
Qty: 30 TABLET | Refills: 11 | Status: SHIPPED | OUTPATIENT
Start: 2018-08-09 | End: 2019-08-05 | Stop reason: SDUPTHER

## 2018-08-09 RX ORDER — DULOXETIN HYDROCHLORIDE 60 MG/1
120 CAPSULE, DELAYED RELEASE ORAL DAILY
Qty: 60 CAPSULE | Refills: 5 | Status: SHIPPED | OUTPATIENT
Start: 2018-08-09 | End: 2019-02-09 | Stop reason: SDUPTHER

## 2018-08-29 DIAGNOSIS — G89.4 CHRONIC PAIN SYNDROME: ICD-10-CM

## 2018-08-29 DIAGNOSIS — M15.9 PRIMARY OSTEOARTHRITIS INVOLVING MULTIPLE JOINTS: ICD-10-CM

## 2018-08-30 RX ORDER — HYDROCODONE BITARTRATE AND ACETAMINOPHEN 7.5; 325 MG/1; MG/1
TABLET ORAL
Qty: 150 TABLET | Refills: 0 | Status: SHIPPED | OUTPATIENT
Start: 2018-08-30 | End: 2018-09-27 | Stop reason: SDUPTHER

## 2018-09-27 DIAGNOSIS — G89.4 CHRONIC PAIN SYNDROME: ICD-10-CM

## 2018-09-27 DIAGNOSIS — M15.9 PRIMARY OSTEOARTHRITIS INVOLVING MULTIPLE JOINTS: ICD-10-CM

## 2018-09-27 RX ORDER — HYDROCODONE BITARTRATE AND ACETAMINOPHEN 7.5; 325 MG/1; MG/1
1 TABLET ORAL EVERY 4 HOURS PRN
Qty: 150 TABLET | Refills: 0 | Status: SHIPPED | OUTPATIENT
Start: 2018-09-27 | End: 2018-10-24 | Stop reason: SDUPTHER

## 2018-10-23 DIAGNOSIS — G89.4 CHRONIC PAIN SYNDROME: ICD-10-CM

## 2018-10-23 DIAGNOSIS — M15.9 PRIMARY OSTEOARTHRITIS INVOLVING MULTIPLE JOINTS: ICD-10-CM

## 2018-10-23 RX ORDER — CARISOPRODOL 350 MG/1
350 TABLET ORAL 4 TIMES DAILY PRN
Qty: 120 TABLET | Refills: 2 | Status: SHIPPED | OUTPATIENT
Start: 2018-10-23 | End: 2019-01-13 | Stop reason: SDUPTHER

## 2018-10-24 DIAGNOSIS — M15.9 PRIMARY OSTEOARTHRITIS INVOLVING MULTIPLE JOINTS: ICD-10-CM

## 2018-10-24 DIAGNOSIS — G89.4 CHRONIC PAIN SYNDROME: ICD-10-CM

## 2018-10-25 RX ORDER — HYDROCODONE BITARTRATE AND ACETAMINOPHEN 7.5; 325 MG/1; MG/1
1 TABLET ORAL EVERY 4 HOURS PRN
Qty: 150 TABLET | Refills: 0 | Status: SHIPPED | OUTPATIENT
Start: 2018-10-25 | End: 2018-11-16 | Stop reason: SDUPTHER

## 2018-10-26 RX ORDER — ALPRAZOLAM 0.5 MG/1
TABLET ORAL
Qty: 60 TABLET | Refills: 1 | Status: CANCELLED | OUTPATIENT
Start: 2018-10-26

## 2018-10-30 ENCOUNTER — OFFICE VISIT (OUTPATIENT)
Dept: INTERNAL MEDICINE | Facility: CLINIC | Age: 58
End: 2018-10-30

## 2018-10-30 VITALS
SYSTOLIC BLOOD PRESSURE: 149 MMHG | DIASTOLIC BLOOD PRESSURE: 79 MMHG | HEIGHT: 63 IN | OXYGEN SATURATION: 94 % | BODY MASS INDEX: 30.44 KG/M2 | HEART RATE: 79 BPM | WEIGHT: 171.8 LBS | TEMPERATURE: 98.2 F

## 2018-10-30 DIAGNOSIS — F41.9 ANXIETY: ICD-10-CM

## 2018-10-30 DIAGNOSIS — G89.4 CHRONIC PAIN SYNDROME: Primary | ICD-10-CM

## 2018-10-30 DIAGNOSIS — M15.9 PRIMARY OSTEOARTHRITIS INVOLVING MULTIPLE JOINTS: ICD-10-CM

## 2018-10-30 DIAGNOSIS — R25.2 CRAMPS, MUSCLE, GENERAL: ICD-10-CM

## 2018-10-30 DIAGNOSIS — Z23 IMMUNIZATION DUE: ICD-10-CM

## 2018-10-30 PROCEDURE — 99213 OFFICE O/P EST LOW 20 MIN: CPT | Performed by: FAMILY MEDICINE

## 2018-10-30 PROCEDURE — 90674 CCIIV4 VAC NO PRSV 0.5 ML IM: CPT | Performed by: FAMILY MEDICINE

## 2018-10-30 PROCEDURE — 90471 IMMUNIZATION ADMIN: CPT | Performed by: FAMILY MEDICINE

## 2018-10-30 RX ORDER — ALPRAZOLAM 0.5 MG/1
0.5 TABLET ORAL 2 TIMES DAILY
Qty: 60 TABLET | Refills: 3 | Status: SHIPPED | OUTPATIENT
Start: 2018-10-30 | End: 2018-11-28 | Stop reason: SDUPTHER

## 2018-10-30 RX ORDER — ROPINIROLE 0.5 MG/1
0.5 TABLET, FILM COATED ORAL 2 TIMES DAILY
Qty: 60 TABLET | Refills: 5 | Status: SHIPPED | OUTPATIENT
Start: 2018-10-30 | End: 2019-09-10

## 2018-10-30 NOTE — PROGRESS NOTES
CC:Pain management,anxiety,hand cramping    Subjective.../HPI  Patient present today with arthritic pain,  2) [id number] her Xanax  I have reviewed the patient's medical history in detail and updated the computerized patient record.  3) muscle cramps  Past Medical History:   Diagnosis Date   • Anxiety    • Arthritis    • Chronic pain    • Depression, acute     recurrent   • Fibromyalgia, primary    • Hypertension    • Left hip pain     c/o pain in left hip       Past Surgical History:   Procedure Laterality Date   • TUBAL ABDOMINAL LIGATION         Family History   Problem Relation Age of Onset   • Cancer Mother         Colon   • Cancer Father         Brain   • Cancer Sister         Pancreatic   • No Known Problems Brother    • No Known Problems Maternal Aunt    • No Known Problems Maternal Uncle    • No Known Problems Paternal Aunt    • No Known Problems Paternal Uncle    • No Known Problems Maternal Grandmother    • No Known Problems Maternal Grandfather    • No Known Problems Paternal Grandmother    • No Known Problems Paternal Grandfather    • Anesthesia problems Neg Hx    • Broken bones Neg Hx    • Clotting disorder Neg Hx    • Collagen disease Neg Hx    • Diabetes Neg Hx    • Dislocations Neg Hx    • Osteoporosis Neg Hx    • Rheumatologic disease Neg Hx    • Scoliosis Neg Hx    • Severe sprains Neg Hx        Social History     Social History   • Marital status:      Spouse name: N/A   • Number of children: N/A   • Years of education: N/A     Occupational History   • Not on file.     Social History Main Topics   • Smoking status: Current Every Day Smoker   • Smokeless tobacco: Never Used   • Alcohol use Yes      Comment: occasional   • Drug use: Yes     Types: Hydrocodone   • Sexual activity: Defer     Other Topics Concern   • Not on file     Social History Narrative   • No narrative on file       Most Recent Immunizations   Administered Date(s) Administered   • Flu Vaccine Quad PF >36MO 10/09/2017  "  • Influenza TIV (IM) 10/28/2016   • Influenza, Unspecified 09/25/2015   • Tdap 01/28/2010       Review of Systems:   Review of Systems   Constitutional: Negative.    HENT: Negative.    Eyes: Negative.    Respiratory: Negative.    Cardiovascular: Negative.    Endocrine: Negative.    Genitourinary: Negative.    Musculoskeletal: Negative.    Skin: Negative.    Allergic/Immunologic: Negative.    Neurological: Negative.    Hematological: Negative.    Psychiatric/Behavioral: Negative.          Physical Exam   Constitutional: She is oriented to person, place, and time. She appears well-developed and well-nourished.   Cardiovascular: Normal rate, regular rhythm and normal heart sounds.    Pulmonary/Chest: Effort normal and breath sounds normal.   Neurological: She is oriented to person, place, and time.   Psychiatric: She has a normal mood and affect. Her behavior is normal.   Vitals reviewed.        Vital Signs     Vitals:    10/30/18 1632   BP: 149/79   BP Location: Left arm   Patient Position: Sitting   Cuff Size: Small Adult   Pulse: 79   Temp: 98.2 °F (36.8 °C)   TempSrc: Oral   SpO2: 94%   Weight: 77.9 kg (171 lb 12.8 oz)   Height: 160 cm (63\")          Results Review:      REVIEWED AND DISCUSSED CLINICAL RESULTS WITH PATIENT      Requested Prescriptions     Signed Prescriptions Disp Refills   • rOPINIRole (REQUIP) 0.5 MG tablet 60 tablet 5     Sig: Take 1 tablet by mouth 2 (Two) Times a Day. Take 1 hour before bedtime.   • ALPRAZolam (XANAX) 0.5 MG tablet 60 tablet 3     Sig: Take 1 tablet by mouth 2 (Two) Times a Day.         Current Outpatient Prescriptions:   •  ALPRAZolam (XANAX) 0.5 MG tablet, Take 1 tablet by mouth 2 (Two) Times a Day., Disp: 60 tablet, Rfl: 3  •  carisoprodol (SOMA) 350 MG tablet, TAKE 1 TABLET BY MOUTH 4 (FOUR) TIMES A DAY AS NEEDED FOR MUSCLE SPASMS., Disp: 120 tablet, Rfl: 2  •  cetirizine (ZyrTEC) 10 MG tablet, Take 10 mg by mouth daily., Disp: , Rfl:   •  DULoxetine (CYMBALTA) 60 MG " capsule, Take 2 capsules by mouth Daily., Disp: 60 capsule, Rfl: 5  •  halobetasol (ULTRAVATE) 0.05 % cream, APPLY  CREAM TOPICALLY TWICE DAILY, Disp: 50 g, Rfl: 0  •  HYDROcodone-acetaminophen (NORCO) 7.5-325 MG per tablet, Take 1 tablet by mouth Every 4 (Four) Hours As Needed for Moderate Pain ., Disp: 150 tablet, Rfl: 0  •  montelukast (SINGULAIR) 10 MG tablet, Take 1 tablet by mouth Every Night., Disp: 30 tablet, Rfl: 11  •  mupirocin (BACTROBAN) 2 % ointment, Apply  topically 3 (Three) Times a Day., Disp: 22 g, Rfl: 5  •  valACYclovir (VALTREX) 500 MG tablet, TAKE 1 TABLET BY MOUTH TWICE DAILY, Disp: 20 tablet, Rfl: 0  •  rOPINIRole (REQUIP) 0.5 MG tablet, Take 1 tablet by mouth 2 (Two) Times a Day. Take 1 hour before bedtime., Disp: 60 tablet, Rfl: 5    Procedures          Diagnoses and all orders for this visit:    Chronic pain syndrome    Primary osteoarthritis involving multiple joints    Cramps, muscle, general  -     rOPINIRole (REQUIP) 0.5 MG tablet; Take 1 tablet by mouth 2 (Two) Times a Day. Take 1 hour before bedtime.    Immunization due  -     Flucelvax Quad=>4Years ([id number])    Anxiety  -     ALPRAZolam (XANAX) 0.5 MG tablet; Take 1 tablet by mouth 2 (Two) Times a Day.         Return in about 3 months (around 1/30/2019) for Recheck.    Dane Galaviz M.D  10/30/18  5:19 PM

## 2018-11-16 DIAGNOSIS — G89.4 CHRONIC PAIN SYNDROME: ICD-10-CM

## 2018-11-16 DIAGNOSIS — M15.9 PRIMARY OSTEOARTHRITIS INVOLVING MULTIPLE JOINTS: ICD-10-CM

## 2018-11-20 RX ORDER — HYDROCODONE BITARTRATE AND ACETAMINOPHEN 7.5; 325 MG/1; MG/1
1 TABLET ORAL EVERY 4 HOURS PRN
Qty: 150 TABLET | Refills: 0 | Status: SHIPPED | OUTPATIENT
Start: 2018-11-20 | End: 2018-12-18 | Stop reason: SDUPTHER

## 2018-11-28 DIAGNOSIS — F41.9 ANXIETY: ICD-10-CM

## 2018-11-29 RX ORDER — ALPRAZOLAM 0.5 MG/1
TABLET ORAL
Qty: 60 TABLET | Refills: 0 | Status: SHIPPED | OUTPATIENT
Start: 2018-11-29 | End: 2018-12-26 | Stop reason: SDUPTHER

## 2018-12-18 DIAGNOSIS — G89.4 CHRONIC PAIN SYNDROME: ICD-10-CM

## 2018-12-18 DIAGNOSIS — M15.9 PRIMARY OSTEOARTHRITIS INVOLVING MULTIPLE JOINTS: ICD-10-CM

## 2018-12-18 RX ORDER — HYDROCODONE BITARTRATE AND ACETAMINOPHEN 7.5; 325 MG/1; MG/1
1 TABLET ORAL EVERY 4 HOURS PRN
Qty: 150 TABLET | Refills: 0 | Status: SHIPPED | OUTPATIENT
Start: 2018-12-18 | End: 2018-12-20 | Stop reason: SDUPTHER

## 2018-12-20 DIAGNOSIS — M15.9 PRIMARY OSTEOARTHRITIS INVOLVING MULTIPLE JOINTS: ICD-10-CM

## 2018-12-20 DIAGNOSIS — G89.4 CHRONIC PAIN SYNDROME: ICD-10-CM

## 2018-12-20 RX ORDER — HYDROCODONE BITARTRATE AND ACETAMINOPHEN 7.5; 325 MG/1; MG/1
1 TABLET ORAL EVERY 4 HOURS PRN
Qty: 150 TABLET | Refills: 0 | Status: SHIPPED | OUTPATIENT
Start: 2018-12-20 | End: 2019-01-14 | Stop reason: SDUPTHER

## 2018-12-26 DIAGNOSIS — F41.9 ANXIETY: ICD-10-CM

## 2018-12-26 RX ORDER — ALPRAZOLAM 0.5 MG/1
TABLET ORAL
Qty: 60 TABLET | Refills: 0 | Status: SHIPPED | OUTPATIENT
Start: 2018-12-26 | End: 2019-01-23 | Stop reason: SDUPTHER

## 2018-12-26 RX ORDER — VALACYCLOVIR HYDROCHLORIDE 500 MG/1
500 TABLET, FILM COATED ORAL 2 TIMES DAILY
Qty: 20 TABLET | Refills: 0 | Status: SHIPPED | OUTPATIENT
Start: 2018-12-26 | End: 2019-02-27 | Stop reason: SDUPTHER

## 2019-01-13 DIAGNOSIS — M15.9 PRIMARY OSTEOARTHRITIS INVOLVING MULTIPLE JOINTS: ICD-10-CM

## 2019-01-13 DIAGNOSIS — G89.4 CHRONIC PAIN SYNDROME: ICD-10-CM

## 2019-01-14 DIAGNOSIS — G89.4 CHRONIC PAIN SYNDROME: ICD-10-CM

## 2019-01-14 DIAGNOSIS — M15.9 PRIMARY OSTEOARTHRITIS INVOLVING MULTIPLE JOINTS: ICD-10-CM

## 2019-01-15 RX ORDER — CARISOPRODOL 350 MG/1
350 TABLET ORAL 4 TIMES DAILY PRN
Qty: 120 TABLET | Refills: 2 | Status: SHIPPED | OUTPATIENT
Start: 2019-01-15 | End: 2019-04-08 | Stop reason: SDUPTHER

## 2019-01-15 RX ORDER — HYDROCODONE BITARTRATE AND ACETAMINOPHEN 7.5; 325 MG/1; MG/1
1 TABLET ORAL EVERY 4 HOURS PRN
Qty: 150 TABLET | Refills: 0 | Status: SHIPPED | OUTPATIENT
Start: 2019-01-15 | End: 2019-02-12

## 2019-01-23 DIAGNOSIS — F41.9 ANXIETY: ICD-10-CM

## 2019-01-24 RX ORDER — ALPRAZOLAM 0.5 MG/1
TABLET ORAL
Qty: 60 TABLET | Refills: 2 | Status: SHIPPED | OUTPATIENT
Start: 2019-01-24 | End: 2019-04-25 | Stop reason: SDUPTHER

## 2019-02-11 RX ORDER — DULOXETIN HYDROCHLORIDE 60 MG/1
CAPSULE, DELAYED RELEASE ORAL
Qty: 60 CAPSULE | Refills: 5 | Status: SHIPPED | OUTPATIENT
Start: 2019-02-11 | End: 2019-08-05 | Stop reason: SDUPTHER

## 2019-02-12 ENCOUNTER — OFFICE VISIT (OUTPATIENT)
Dept: INTERNAL MEDICINE | Facility: CLINIC | Age: 59
End: 2019-02-12

## 2019-02-12 VITALS
BODY MASS INDEX: 29.45 KG/M2 | TEMPERATURE: 97.2 F | SYSTOLIC BLOOD PRESSURE: 151 MMHG | WEIGHT: 166.2 LBS | DIASTOLIC BLOOD PRESSURE: 86 MMHG | HEIGHT: 63 IN | HEART RATE: 71 BPM | OXYGEN SATURATION: 96 %

## 2019-02-12 DIAGNOSIS — F41.9 ANXIETY: Primary | ICD-10-CM

## 2019-02-12 DIAGNOSIS — G89.4 CHRONIC PAIN SYNDROME: ICD-10-CM

## 2019-02-12 DIAGNOSIS — M15.9 PRIMARY OSTEOARTHRITIS INVOLVING MULTIPLE JOINTS: ICD-10-CM

## 2019-02-12 PROCEDURE — 99213 OFFICE O/P EST LOW 20 MIN: CPT | Performed by: FAMILY MEDICINE

## 2019-02-12 RX ORDER — HYDROCODONE BITARTRATE AND ACETAMINOPHEN 7.5; 325 MG/1; MG/1
1 TABLET ORAL EVERY 4 HOURS PRN
Qty: 150 TABLET | Refills: 0 | Status: SHIPPED | OUTPATIENT
Start: 2019-02-12 | End: 2019-02-15 | Stop reason: SDUPTHER

## 2019-02-12 NOTE — PROGRESS NOTES
CC:pain management, eyes ,tobacco abuse  anxiety  Subjective.../HPI  Patient present today with1) anxiety- tkes 2 Xanax hs for sleep  2) eyes itching  3) pain diffuse arthritis, especiaaly back and knees  I have reviewed the patient's medical history in detail and updated the computerized patient record.  4) tobacco abuse- 1ppd-no interestr in quitting  Past Medical History:   Diagnosis Date   • Anxiety    • Arthritis    • Chronic pain    • Depression, acute     recurrent   • Fibromyalgia, primary    • Hypertension    • Left hip pain     c/o pain in left hip       Past Surgical History:   Procedure Laterality Date   • TUBAL ABDOMINAL LIGATION         Family History   Problem Relation Age of Onset   • Cancer Mother         Colon   • Cancer Father         Brain   • Cancer Sister         Pancreatic   • No Known Problems Brother    • No Known Problems Maternal Aunt    • No Known Problems Maternal Uncle    • No Known Problems Paternal Aunt    • No Known Problems Paternal Uncle    • No Known Problems Maternal Grandmother    • No Known Problems Maternal Grandfather    • No Known Problems Paternal Grandmother    • No Known Problems Paternal Grandfather    • Anesthesia problems Neg Hx    • Broken bones Neg Hx    • Clotting disorder Neg Hx    • Collagen disease Neg Hx    • Diabetes Neg Hx    • Dislocations Neg Hx    • Osteoporosis Neg Hx    • Rheumatologic disease Neg Hx    • Scoliosis Neg Hx    • Severe sprains Neg Hx        Social History     Socioeconomic History   • Marital status:      Spouse name: Not on file   • Number of children: Not on file   • Years of education: Not on file   • Highest education level: Not on file   Social Needs   • Financial resource strain: Not on file   • Food insecurity - worry: Not on file   • Food insecurity - inability: Not on file   • Transportation needs - medical: Not on file   • Transportation needs - non-medical: Not on file   Occupational History   • Not on file   Tobacco Use  "  • Smoking status: Current Every Day Smoker   • Smokeless tobacco: Never Used   Substance and Sexual Activity   • Alcohol use: Yes     Comment: occasional   • Drug use: Yes     Types: Hydrocodone   • Sexual activity: Defer   Other Topics Concern   • Not on file   Social History Narrative   • Not on file       Most Recent Immunizations   Administered Date(s) Administered   • Flu Vaccine Quad PF >36MO 10/09/2017   • Influenza TIV (IM) 10/28/2016   • Influenza, Unspecified 09/25/2015   • Tdap 01/28/2010   • flucelvax quad pfs =>4 YRS 10/30/2018       Review of Systems:   Review of Systems   Constitutional: Negative.    HENT: Negative.    Eyes: Negative.    Respiratory: Negative.    Cardiovascular: Negative.    Gastrointestinal: Negative.    Endocrine: Negative.    Genitourinary: Negative.    Musculoskeletal: Negative.    Skin: Negative.          Physical Exam   Constitutional: She is oriented to person, place, and time. She appears well-developed and well-nourished.   Cardiovascular: Normal rate, regular rhythm and normal heart sounds.   Pulmonary/Chest: Effort normal and breath sounds normal.   Neurological: She is alert and oriented to person, place, and time.   Skin: Skin is dry.   Psychiatric: She has a normal mood and affect. Her behavior is normal.   Vitals reviewed.        Vital Signs     Vitals:    02/12/19 1453   BP: 151/86   BP Location: Left arm   Patient Position: Sitting   Cuff Size: Small Adult   Pulse: 71   Temp: 97.2 °F (36.2 °C)   TempSrc: Oral   SpO2: 96%   Weight: 75.4 kg (166 lb 3.2 oz)   Height: 160 cm (63\")          Results Review:      REVIEWED AND DISCUSSED CLINICAL RESULTS WITH PATIENT      Requested Prescriptions      No prescriptions requested or ordered in this encounter         Current Outpatient Medications:   •  ALPRAZolam (XANAX) 0.5 MG tablet, TAKE 1 TABLET BY MOUTH TWICE A DAY, Disp: 60 tablet, Rfl: 2  •  carisoprodol (SOMA) 350 MG tablet, TAKE 1 TABLET BY MOUTH 4 (FOUR) TIMES A DAY " AS NEEDED FOR MUSCLE SPASMS., Disp: 120 tablet, Rfl: 2  •  DULoxetine (CYMBALTA) 60 MG capsule, TAKE 2 CAPSULES BY MOUTH EVERY DAY, Disp: 60 capsule, Rfl: 5  •  halobetasol (ULTRAVATE) 0.05 % cream, APPLY  CREAM TOPICALLY TWICE DAILY, Disp: 50 g, Rfl: 0  •  HYDROcodone-acetaminophen (NORCO) 7.5-325 MG per tablet, Take 1 tablet by mouth Every 4 (Four) Hours As Needed for Moderate Pain ., Disp: 150 tablet, Rfl: 0  •  montelukast (SINGULAIR) 10 MG tablet, Take 1 tablet by mouth Every Night., Disp: 30 tablet, Rfl: 11  •  mupirocin (BACTROBAN) 2 % ointment, Apply  topically 3 (Three) Times a Day., Disp: 22 g, Rfl: 5  •  valACYclovir (VALTREX) 500 MG tablet, Take 1 tablet by mouth 2 (Two) Times a Day., Disp: 20 tablet, Rfl: 0  •  cetirizine (ZyrTEC) 10 MG tablet, Take 10 mg by mouth daily., Disp: , Rfl:   •  rOPINIRole (REQUIP) 0.5 MG tablet, Take 1 tablet by mouth 2 (Two) Times a Day. Take 1 hour before bedtime., Disp: 60 tablet, Rfl: 5    Procedures          There are no diagnoses linked to this encounter.    There are no Patient Instructions on file for this visit.     No Follow-up on file.    Dane Galaviz M.D  02/12/19  4:11 PM

## 2019-02-15 DIAGNOSIS — G89.4 CHRONIC PAIN SYNDROME: ICD-10-CM

## 2019-02-15 DIAGNOSIS — M15.9 PRIMARY OSTEOARTHRITIS INVOLVING MULTIPLE JOINTS: ICD-10-CM

## 2019-02-15 RX ORDER — HYDROCODONE BITARTRATE AND ACETAMINOPHEN 7.5; 325 MG/1; MG/1
1 TABLET ORAL EVERY 4 HOURS PRN
Qty: 150 TABLET | Refills: 0 | Status: SHIPPED | OUTPATIENT
Start: 2019-02-15 | End: 2019-03-11 | Stop reason: SDUPTHER

## 2019-02-28 RX ORDER — VALACYCLOVIR HYDROCHLORIDE 500 MG/1
TABLET, FILM COATED ORAL
Qty: 20 TABLET | Refills: 0 | Status: SHIPPED | OUTPATIENT
Start: 2019-02-28 | End: 2019-05-15 | Stop reason: SDUPTHER

## 2019-03-11 DIAGNOSIS — M15.9 PRIMARY OSTEOARTHRITIS INVOLVING MULTIPLE JOINTS: ICD-10-CM

## 2019-03-11 DIAGNOSIS — G89.4 CHRONIC PAIN SYNDROME: ICD-10-CM

## 2019-03-12 RX ORDER — HYDROCODONE BITARTRATE AND ACETAMINOPHEN 7.5; 325 MG/1; MG/1
1 TABLET ORAL EVERY 4 HOURS PRN
Qty: 150 TABLET | Refills: 0 | Status: SHIPPED | OUTPATIENT
Start: 2019-03-12 | End: 2019-04-04 | Stop reason: SDUPTHER

## 2019-04-04 DIAGNOSIS — M15.9 PRIMARY OSTEOARTHRITIS INVOLVING MULTIPLE JOINTS: ICD-10-CM

## 2019-04-04 DIAGNOSIS — G89.4 CHRONIC PAIN SYNDROME: ICD-10-CM

## 2019-04-05 RX ORDER — HYDROCODONE BITARTRATE AND ACETAMINOPHEN 7.5; 325 MG/1; MG/1
1 TABLET ORAL EVERY 4 HOURS PRN
Qty: 150 TABLET | Refills: 0 | Status: SHIPPED | OUTPATIENT
Start: 2019-04-05 | End: 2019-04-30 | Stop reason: SDUPTHER

## 2019-04-08 DIAGNOSIS — M15.9 PRIMARY OSTEOARTHRITIS INVOLVING MULTIPLE JOINTS: ICD-10-CM

## 2019-04-08 DIAGNOSIS — G89.4 CHRONIC PAIN SYNDROME: ICD-10-CM

## 2019-04-09 RX ORDER — CARISOPRODOL 350 MG/1
350 TABLET ORAL 4 TIMES DAILY PRN
Qty: 120 TABLET | Refills: 2 | Status: SHIPPED | OUTPATIENT
Start: 2019-04-09 | End: 2019-06-27 | Stop reason: SDUPTHER

## 2019-04-25 DIAGNOSIS — F41.9 ANXIETY: ICD-10-CM

## 2019-04-25 RX ORDER — ALPRAZOLAM 0.5 MG/1
TABLET ORAL
Qty: 60 TABLET | Refills: 2 | Status: SHIPPED | OUTPATIENT
Start: 2019-04-25 | End: 2019-07-22 | Stop reason: SDUPTHER

## 2019-04-30 DIAGNOSIS — M15.9 PRIMARY OSTEOARTHRITIS INVOLVING MULTIPLE JOINTS: ICD-10-CM

## 2019-04-30 DIAGNOSIS — G89.4 CHRONIC PAIN SYNDROME: ICD-10-CM

## 2019-04-30 RX ORDER — HYDROCODONE BITARTRATE AND ACETAMINOPHEN 7.5; 325 MG/1; MG/1
1 TABLET ORAL EVERY 4 HOURS PRN
Qty: 150 TABLET | Refills: 0 | Status: SHIPPED | OUTPATIENT
Start: 2019-04-30 | End: 2019-05-23 | Stop reason: SDUPTHER

## 2019-05-14 ENCOUNTER — OFFICE VISIT (OUTPATIENT)
Dept: INTERNAL MEDICINE | Facility: CLINIC | Age: 59
End: 2019-05-14

## 2019-05-14 VITALS
DIASTOLIC BLOOD PRESSURE: 86 MMHG | TEMPERATURE: 98.4 F | HEART RATE: 89 BPM | SYSTOLIC BLOOD PRESSURE: 150 MMHG | BODY MASS INDEX: 29.95 KG/M2 | WEIGHT: 169 LBS | HEIGHT: 63 IN | OXYGEN SATURATION: 98 %

## 2019-05-14 DIAGNOSIS — G89.4 CHRONIC PAIN SYNDROME: ICD-10-CM

## 2019-05-14 DIAGNOSIS — M15.9 PRIMARY OSTEOARTHRITIS INVOLVING MULTIPLE JOINTS: ICD-10-CM

## 2019-05-14 DIAGNOSIS — M50.00 CERVICAL DISC DISEASE WITH MYELOPATHY: Primary | ICD-10-CM

## 2019-05-14 PROCEDURE — 99213 OFFICE O/P EST LOW 20 MIN: CPT | Performed by: FAMILY MEDICINE

## 2019-05-14 RX ORDER — MELOXICAM 7.5 MG/1
7.5 TABLET ORAL DAILY
Refills: 0 | COMMUNITY
Start: 2019-05-06 | End: 2019-09-10

## 2019-05-14 RX ORDER — METHYLPREDNISOLONE 4 MG/1
TABLET ORAL
Qty: 21 TABLET | Refills: 0 | Status: SHIPPED | OUTPATIENT
Start: 2019-05-14 | End: 2019-09-10

## 2019-05-14 RX ORDER — CYCLOBENZAPRINE HCL 5 MG
5 TABLET ORAL
COMMUNITY
Start: 2019-05-06 | End: 2019-05-16

## 2019-05-14 NOTE — PROGRESS NOTES
CC:L arm pain  aned neck pain,,chronic low back pain,anxiety,depression    Subjective.../HPI  Patient present today with1) anxiety- Xanax bid  2) NEW neck pain radiating down L arm  3) depression good with cymgbalta  I have reviewed the patient's medical history in detail and updated the computerized patient record.    Past Medical History:   Diagnosis Date   • Anxiety    • Arthritis    • Cervical disc disease with myelopathy 5/14/2019   • Chronic pain    • Depression, acute     recurrent   • Fibromyalgia, primary    • Hypertension    • Left hip pain     c/o pain in left hip       Past Surgical History:   Procedure Laterality Date   • TUBAL ABDOMINAL LIGATION         Family History   Problem Relation Age of Onset   • Cancer Mother         Colon   • Cancer Father         Brain   • Cancer Sister         Pancreatic   • No Known Problems Brother    • No Known Problems Maternal Aunt    • No Known Problems Maternal Uncle    • No Known Problems Paternal Aunt    • No Known Problems Paternal Uncle    • No Known Problems Maternal Grandmother    • No Known Problems Maternal Grandfather    • No Known Problems Paternal Grandmother    • No Known Problems Paternal Grandfather    • Anesthesia problems Neg Hx    • Broken bones Neg Hx    • Clotting disorder Neg Hx    • Collagen disease Neg Hx    • Diabetes Neg Hx    • Dislocations Neg Hx    • Osteoporosis Neg Hx    • Rheumatologic disease Neg Hx    • Scoliosis Neg Hx    • Severe sprains Neg Hx        Social History     Socioeconomic History   • Marital status:      Spouse name: Not on file   • Number of children: Not on file   • Years of education: Not on file   • Highest education level: Not on file   Tobacco Use   • Smoking status: Current Every Day Smoker   • Smokeless tobacco: Never Used   Substance and Sexual Activity   • Alcohol use: Yes     Comment: occasional   • Drug use: Yes     Types: Hydrocodone   • Sexual activity: Defer       Most Recent Immunizations  "  Administered Date(s) Administered   • Flu Vaccine Quad PF >36MO 10/09/2017   • Influenza TIV (IM) 10/28/2016   • Influenza, Unspecified 09/25/2015   • Tdap 01/28/2010   • flucelvax quad pfs =>4 YRS 10/30/2018       Review of Systems:   Review of Systems   Constitutional: Negative.    HENT: Negative.    Eyes: Negative.    Respiratory: Negative.    Cardiovascular: Negative.    Gastrointestinal: Negative.    Endocrine: Negative.    Genitourinary: Negative.    Musculoskeletal: Negative.    Skin: Negative.    Allergic/Immunologic: Negative.    Neurological: Negative.    Hematological: Negative.    Psychiatric/Behavioral: Negative.          Physical Exam   Constitutional: She is oriented to person, place, and time. She appears well-developed and well-nourished.   Cardiovascular: Normal rate, regular rhythm and normal heart sounds.   Pulmonary/Chest: Effort normal and breath sounds normal.   Musculoskeletal:   decresed neck flexion   Neurological: She is alert and oriented to person, place, and time.   Psychiatric: She has a normal mood and affect. Her behavior is normal. Thought content normal.   Nursing note and vitals reviewed.        Vital Signs     Vitals:    05/14/19 1431   BP: 150/86   BP Location: Right arm   Patient Position: Sitting   Cuff Size: Small Adult   Pulse: 89   Temp: 98.4 °F (36.9 °C)   TempSrc: Oral   SpO2: 98%   Weight: 76.7 kg (169 lb)   Height: 160 cm (63\")          Results Review:      REVIEWED AND DISCUSSED CLINICAL RESULTS WITH PATIENT      Requested Prescriptions     Signed Prescriptions Disp Refills   • methylPREDNISolone (MEDROL, ANIBAL,) 4 MG tablet 21 tablet 0     Sig: Take as directed on package instructions.         Current Outpatient Medications:   •  ALPRAZolam (XANAX) 0.5 MG tablet, TAKE 1 TABLET BY MOUTH TWICE A DAY, Disp: 60 tablet, Rfl: 2  •  carisoprodol (SOMA) 350 MG tablet, TAKE 1 TABLET BY MOUTH 4 (FOUR) TIMES A DAY AS NEEDED FOR MUSCLE SPASMS., Disp: 120 tablet, Rfl: 2  •  " cetirizine (ZyrTEC) 10 MG tablet, Take 10 mg by mouth daily., Disp: , Rfl:   •  DULoxetine (CYMBALTA) 60 MG capsule, TAKE 2 CAPSULES BY MOUTH EVERY DAY, Disp: 60 capsule, Rfl: 5  •  halobetasol (ULTRAVATE) 0.05 % cream, APPLY  CREAM TOPICALLY TWICE DAILY, Disp: 50 g, Rfl: 0  •  HYDROcodone-acetaminophen (NORCO) 7.5-325 MG per tablet, Take 1 tablet by mouth Every 4 (Four) Hours As Needed for Moderate Pain ., Disp: 150 tablet, Rfl: 0  •  montelukast (SINGULAIR) 10 MG tablet, Take 1 tablet by mouth Every Night., Disp: 30 tablet, Rfl: 11  •  mupirocin (BACTROBAN) 2 % ointment, Apply  topically 3 (Three) Times a Day., Disp: 22 g, Rfl: 5  •  valACYclovir (VALTREX) 500 MG tablet, TAKE 1 TABLET BY MOUTH TWICE A DAY, Disp: 20 tablet, Rfl: 0  •  cyclobenzaprine (FLEXERIL) 5 MG tablet, Take 5 mg by mouth., Disp: , Rfl:   •  meloxicam (MOBIC) 7.5 MG tablet, Take 7.5 mg by mouth Daily., Disp: , Rfl: 0  •  methylPREDNISolone (MEDROL, ANIBAL,) 4 MG tablet, Take as directed on package instructions., Disp: 21 tablet, Rfl: 0  •  rOPINIRole (REQUIP) 0.5 MG tablet, Take 1 tablet by mouth 2 (Two) Times a Day. Take 1 hour before bedtime., Disp: 60 tablet, Rfl: 5    Procedures          Diagnoses and all orders for this visit:    Cervical disc disease with myelopathy    Chronic pain syndrome    Primary osteoarthritis involving multiple joints    Other orders  -     cyclobenzaprine (FLEXERIL) 5 MG tablet; Take 5 mg by mouth.  -     meloxicam (MOBIC) 7.5 MG tablet; Take 7.5 mg by mouth Daily.  -     methylPREDNISolone (MEDROL, ANIBAL,) 4 MG tablet; Take as directed on package instructions.        There are no Patient Instructions on file for this visit.     Return in about 3 months (around 8/14/2019).    Dane Galaviz M.D  05/14/19  3:48 PM

## 2019-05-15 RX ORDER — VALACYCLOVIR HYDROCHLORIDE 500 MG/1
TABLET, FILM COATED ORAL
Qty: 20 TABLET | Refills: 0 | Status: SHIPPED | OUTPATIENT
Start: 2019-05-15 | End: 2019-07-10 | Stop reason: SDUPTHER

## 2019-05-23 DIAGNOSIS — M15.9 PRIMARY OSTEOARTHRITIS INVOLVING MULTIPLE JOINTS: ICD-10-CM

## 2019-05-23 DIAGNOSIS — G89.4 CHRONIC PAIN SYNDROME: ICD-10-CM

## 2019-05-28 RX ORDER — HYDROCODONE BITARTRATE AND ACETAMINOPHEN 7.5; 325 MG/1; MG/1
1 TABLET ORAL EVERY 4 HOURS PRN
Qty: 150 TABLET | Refills: 0 | Status: SHIPPED | OUTPATIENT
Start: 2019-05-28 | End: 2019-06-20 | Stop reason: SDUPTHER

## 2019-06-20 DIAGNOSIS — M15.9 PRIMARY OSTEOARTHRITIS INVOLVING MULTIPLE JOINTS: ICD-10-CM

## 2019-06-20 DIAGNOSIS — G89.4 CHRONIC PAIN SYNDROME: ICD-10-CM

## 2019-06-20 RX ORDER — HYDROCODONE BITARTRATE AND ACETAMINOPHEN 7.5; 325 MG/1; MG/1
1 TABLET ORAL EVERY 4 HOURS PRN
Qty: 150 TABLET | Refills: 0 | Status: SHIPPED | OUTPATIENT
Start: 2019-06-20 | End: 2019-07-18 | Stop reason: SDUPTHER

## 2019-06-27 DIAGNOSIS — M15.9 PRIMARY OSTEOARTHRITIS INVOLVING MULTIPLE JOINTS: ICD-10-CM

## 2019-06-27 DIAGNOSIS — G89.4 CHRONIC PAIN SYNDROME: ICD-10-CM

## 2019-06-27 RX ORDER — CARISOPRODOL 350 MG/1
350 TABLET ORAL 4 TIMES DAILY PRN
Qty: 120 TABLET | Refills: 2 | Status: SHIPPED | OUTPATIENT
Start: 2019-06-27 | End: 2019-09-19 | Stop reason: SDUPTHER

## 2019-07-11 RX ORDER — VALACYCLOVIR HYDROCHLORIDE 500 MG/1
TABLET, FILM COATED ORAL
Qty: 20 TABLET | Refills: 0 | Status: SHIPPED | OUTPATIENT
Start: 2019-07-11 | End: 2019-08-20 | Stop reason: SDUPTHER

## 2019-07-18 DIAGNOSIS — M15.9 PRIMARY OSTEOARTHRITIS INVOLVING MULTIPLE JOINTS: ICD-10-CM

## 2019-07-18 DIAGNOSIS — G89.4 CHRONIC PAIN SYNDROME: ICD-10-CM

## 2019-07-18 RX ORDER — HYDROCODONE BITARTRATE AND ACETAMINOPHEN 7.5; 325 MG/1; MG/1
1 TABLET ORAL EVERY 4 HOURS PRN
Qty: 150 TABLET | Refills: 0 | Status: SHIPPED | OUTPATIENT
Start: 2019-07-18 | End: 2019-08-13 | Stop reason: SDUPTHER

## 2019-07-22 DIAGNOSIS — F41.9 ANXIETY: ICD-10-CM

## 2019-07-23 RX ORDER — ALPRAZOLAM 0.5 MG/1
TABLET ORAL
Qty: 60 TABLET | Refills: 2 | Status: SHIPPED | OUTPATIENT
Start: 2019-07-23 | End: 2019-10-15 | Stop reason: SDUPTHER

## 2019-08-05 DIAGNOSIS — J30.1 ACUTE SEASONAL ALLERGIC RHINITIS DUE TO POLLEN: ICD-10-CM

## 2019-08-05 RX ORDER — MONTELUKAST SODIUM 10 MG/1
TABLET ORAL
Qty: 30 TABLET | Refills: 11 | Status: SHIPPED | OUTPATIENT
Start: 2019-08-05 | End: 2020-05-19 | Stop reason: SDUPTHER

## 2019-08-06 RX ORDER — DULOXETIN HYDROCHLORIDE 60 MG/1
CAPSULE, DELAYED RELEASE ORAL
Qty: 60 CAPSULE | Refills: 5 | Status: SHIPPED | OUTPATIENT
Start: 2019-08-06 | End: 2020-02-03

## 2019-08-13 DIAGNOSIS — M15.9 PRIMARY OSTEOARTHRITIS INVOLVING MULTIPLE JOINTS: ICD-10-CM

## 2019-08-13 DIAGNOSIS — G89.4 CHRONIC PAIN SYNDROME: ICD-10-CM

## 2019-08-13 RX ORDER — HYDROCODONE BITARTRATE AND ACETAMINOPHEN 7.5; 325 MG/1; MG/1
1 TABLET ORAL EVERY 4 HOURS PRN
Qty: 150 TABLET | Refills: 0 | Status: SHIPPED | OUTPATIENT
Start: 2019-08-13 | End: 2019-09-10 | Stop reason: SDUPTHER

## 2019-08-13 NOTE — TELEPHONE ENCOUNTER
Next OV 09/10/2019  Jj 06/20/2019  Contract 10/30/2018        Pt  called stating pt needed refill on hydrocodone-acetaminophen. Spoke with the pt and she stated would like refill on medication

## 2019-08-20 RX ORDER — VALACYCLOVIR HYDROCHLORIDE 500 MG/1
TABLET, FILM COATED ORAL
Qty: 20 TABLET | Refills: 0 | Status: SHIPPED | OUTPATIENT
Start: 2019-08-20 | End: 2019-12-08 | Stop reason: SDUPTHER

## 2019-09-10 ENCOUNTER — OFFICE VISIT (OUTPATIENT)
Dept: INTERNAL MEDICINE | Facility: CLINIC | Age: 59
End: 2019-09-10

## 2019-09-10 VITALS
WEIGHT: 164.4 LBS | HEART RATE: 83 BPM | SYSTOLIC BLOOD PRESSURE: 159 MMHG | DIASTOLIC BLOOD PRESSURE: 80 MMHG | BODY MASS INDEX: 29.13 KG/M2 | HEIGHT: 63 IN | TEMPERATURE: 96 F | OXYGEN SATURATION: 96 %

## 2019-09-10 DIAGNOSIS — G89.4 CHRONIC PAIN SYNDROME: ICD-10-CM

## 2019-09-10 DIAGNOSIS — M15.9 PRIMARY OSTEOARTHRITIS INVOLVING MULTIPLE JOINTS: ICD-10-CM

## 2019-09-10 DIAGNOSIS — L23.5 ALLERGIC DERMATITIS DUE TO OTHER CHEMICAL PRODUCT: Primary | ICD-10-CM

## 2019-09-10 PROBLEM — L23.9 ALLERGIC CONTACT DERMATITIS: Status: ACTIVE | Noted: 2019-09-10

## 2019-09-10 PROCEDURE — 99213 OFFICE O/P EST LOW 20 MIN: CPT | Performed by: FAMILY MEDICINE

## 2019-09-10 RX ORDER — HYDROCODONE BITARTRATE AND ACETAMINOPHEN 7.5; 325 MG/1; MG/1
1 TABLET ORAL EVERY 4 HOURS PRN
Qty: 150 TABLET | Refills: 0 | Status: SHIPPED | OUTPATIENT
Start: 2019-09-10 | End: 2019-10-08 | Stop reason: SDUPTHER

## 2019-09-10 RX ORDER — MELOXICAM 15 MG/1
15 TABLET ORAL DAILY
Qty: 30 TABLET | Refills: 5 | Status: SHIPPED | OUTPATIENT
Start: 2019-09-10 | End: 2020-03-27 | Stop reason: SDUPTHER

## 2019-09-10 NOTE — PROGRESS NOTES
CC:fatigue,pain management- back and all mjoints  Arthritis,R elbow time  Subjective.../HPI  Patient present today with1) pain- takes 5 norco/day  2) arthritis-off meloxicam  3)gerd    I have reviewed the patient's medical history in detail and updated the computerized patient record.    Past Medical History:   Diagnosis Date   • Anxiety    • Arthritis    • Cervical disc disease with myelopathy 5/14/2019   • Chronic pain    • Depression, acute     recurrent   • Fibromyalgia, primary    • Hypertension    • Left hip pain     c/o pain in left hip       Past Surgical History:   Procedure Laterality Date   • TUBAL ABDOMINAL LIGATION         Family History   Problem Relation Age of Onset   • Cancer Mother         Colon   • Cancer Father         Brain   • Cancer Sister         Pancreatic   • No Known Problems Brother    • No Known Problems Maternal Aunt    • No Known Problems Maternal Uncle    • No Known Problems Paternal Aunt    • No Known Problems Paternal Uncle    • No Known Problems Maternal Grandmother    • No Known Problems Maternal Grandfather    • No Known Problems Paternal Grandmother    • No Known Problems Paternal Grandfather    • Anesthesia problems Neg Hx    • Broken bones Neg Hx    • Clotting disorder Neg Hx    • Collagen disease Neg Hx    • Diabetes Neg Hx    • Dislocations Neg Hx    • Osteoporosis Neg Hx    • Rheumatologic disease Neg Hx    • Scoliosis Neg Hx    • Severe sprains Neg Hx        Social History     Socioeconomic History   • Marital status:      Spouse name: Not on file   • Number of children: Not on file   • Years of education: Not on file   • Highest education level: Not on file   Tobacco Use   • Smoking status: Current Every Day Smoker   • Smokeless tobacco: Never Used   Substance and Sexual Activity   • Alcohol use: Yes     Comment: occasional   • Drug use: Yes     Types: Hydrocodone   • Sexual activity: Defer       Most Recent Immunizations   Administered Date(s) Administered   • Flu  "Vaccine Quad PF >36MO 10/09/2017   • Influenza TIV (IM) 10/28/2016   • Influenza, Unspecified 09/25/2015   • Tdap 01/28/2010   • flucelvax quad pfs =>4 YRS 10/30/2018       Review of Systems:   Review of Systems   Constitutional: Negative.    Musculoskeletal: Positive for arthralgias, back pain and neck pain.   All other systems reviewed and are negative.        Physical Exam   Constitutional: She is oriented to person, place, and time. She appears well-developed and well-nourished.   Cardiovascular: Normal rate, regular rhythm and normal heart sounds.   Pulmonary/Chest: Effort normal and breath sounds normal.   Musculoskeletal:   Knee tenderness   Neurological: She is alert and oriented to person, place, and time.   Psychiatric: She has a normal mood and affect. Her behavior is normal. Judgment and thought content normal.   Vitals reviewed.        Vital Signs     Vitals:    09/10/19 1424   BP: 159/80   BP Location: Left arm   Patient Position: Sitting   Cuff Size: Small Adult   Pulse: 83   Temp: 96 °F (35.6 °C)   TempSrc: Oral   SpO2: 96%   Weight: 74.6 kg (164 lb 6.4 oz)   Height: 160 cm (63\")          Results Review:      REVIEWED AND DISCUSSED CLINICAL RESULTS WITH PATIENT      Requested Prescriptions     Signed Prescriptions Disp Refills   • mupirocin (BACTROBAN) 2 % ointment 30 g 5     Sig: Apply  topically to the appropriate area as directed 2 (Two) Times a Day.   • betamethasone valerate (VALISONE) 0.1 % cream 45 g 5     Sig: Apply  topically to the appropriate area as directed 2 (Two) Times a Day.   • meloxicam (MOBIC) 15 MG tablet 30 tablet 5     Sig: Take 1 tablet by mouth Daily.   • HYDROcodone-acetaminophen (NORCO) 7.5-325 MG per tablet 150 tablet 0     Sig: Take 1 tablet by mouth Every 4 (Four) Hours As Needed for Moderate Pain .         Current Outpatient Medications:   •  ALPRAZolam (XANAX) 0.5 MG tablet, TAKE 1 TABLET BY MOUTH TWICE A DAY, Disp: 60 tablet, Rfl: 2  •  carisoprodol (SOMA) 350 MG " tablet, TAKE 1 TABLET BY MOUTH 4 (FOUR) TIMES A DAY AS NEEDED FOR MUSCLE SPASMS., Disp: 120 tablet, Rfl: 2  •  cetirizine (ZyrTEC) 10 MG tablet, Take 10 mg by mouth daily., Disp: , Rfl:   •  DULoxetine (CYMBALTA) 60 MG capsule, TAKE 2 CAPSULES BY MOUTH EVERY DAY, Disp: 60 capsule, Rfl: 5  •  halobetasol (ULTRAVATE) 0.05 % cream, APPLY  CREAM TOPICALLY TWICE DAILY, Disp: 50 g, Rfl: 0  •  HYDROcodone-acetaminophen (NORCO) 7.5-325 MG per tablet, Take 1 tablet by mouth Every 4 (Four) Hours As Needed for Moderate Pain ., Disp: 150 tablet, Rfl: 0  •  montelukast (SINGULAIR) 10 MG tablet, TAKE 1 TABLET BY MOUTH EVERY DAY AT NIGHT, Disp: 30 tablet, Rfl: 11  •  mupirocin (BACTROBAN) 2 % ointment, Apply  topically 3 (Three) Times a Day., Disp: 22 g, Rfl: 5  •  valACYclovir (VALTREX) 500 MG tablet, TAKE 1 TABLET BY MOUTH TWICE A DAY, Disp: 20 tablet, Rfl: 0  •  betamethasone valerate (VALISONE) 0.1 % cream, Apply  topically to the appropriate area as directed 2 (Two) Times a Day., Disp: 45 g, Rfl: 5  •  meloxicam (MOBIC) 15 MG tablet, Take 1 tablet by mouth Daily., Disp: 30 tablet, Rfl: 5  •  mupirocin (BACTROBAN) 2 % ointment, Apply  topically to the appropriate area as directed 2 (Two) Times a Day., Disp: 30 g, Rfl: 5    Procedures          Diagnoses and all orders for this visit:    Allergic dermatitis due to other chemical product  -     mupirocin (BACTROBAN) 2 % ointment; Apply  topically to the appropriate area as directed 2 (Two) Times a Day.  -     betamethasone valerate (VALISONE) 0.1 % cream; Apply  topically to the appropriate area as directed 2 (Two) Times a Day.    Chronic pain syndrome  -     HYDROcodone-acetaminophen (NORCO) 7.5-325 MG per tablet; Take 1 tablet by mouth Every 4 (Four) Hours As Needed for Moderate Pain .    Primary osteoarthritis involving multiple joints  -     HYDROcodone-acetaminophen (NORCO) 7.5-325 MG per tablet; Take 1 tablet by mouth Every 4 (Four) Hours As Needed for Moderate Pain  .    Other orders  -     meloxicam (MOBIC) 15 MG tablet; Take 1 tablet by mouth Daily.        There are no Patient Instructions on file for this visit.     Return in about 3 months (around 12/10/2019).    Dane Galaviz M.D  09/10/19  3:04 PM

## 2019-09-19 DIAGNOSIS — M15.9 PRIMARY OSTEOARTHRITIS INVOLVING MULTIPLE JOINTS: ICD-10-CM

## 2019-09-19 DIAGNOSIS — G89.4 CHRONIC PAIN SYNDROME: ICD-10-CM

## 2019-09-19 RX ORDER — CARISOPRODOL 350 MG/1
TABLET ORAL
Qty: 120 TABLET | Refills: 2 | Status: SHIPPED | OUTPATIENT
Start: 2019-09-19 | End: 2019-12-18 | Stop reason: SDUPTHER

## 2019-10-08 DIAGNOSIS — G89.4 CHRONIC PAIN SYNDROME: ICD-10-CM

## 2019-10-08 DIAGNOSIS — M15.9 PRIMARY OSTEOARTHRITIS INVOLVING MULTIPLE JOINTS: ICD-10-CM

## 2019-10-08 NOTE — TELEPHONE ENCOUNTER
Next OV 12/17/2019  Jj 09/06/2019  Contract 09/10/2019      Pt came in on 10/08/2019 and signed a new prescribing agreement pt changed her pharmacy.

## 2019-10-09 RX ORDER — HYDROCODONE BITARTRATE AND ACETAMINOPHEN 7.5; 325 MG/1; MG/1
1 TABLET ORAL EVERY 4 HOURS PRN
Qty: 150 TABLET | Refills: 0 | Status: SHIPPED | OUTPATIENT
Start: 2019-10-09 | End: 2019-11-01 | Stop reason: SDUPTHER

## 2019-10-15 DIAGNOSIS — F41.9 ANXIETY: ICD-10-CM

## 2019-10-15 RX ORDER — ALPRAZOLAM 0.5 MG/1
TABLET ORAL
Qty: 60 TABLET | Refills: 2 | Status: SHIPPED | OUTPATIENT
Start: 2019-10-15 | End: 2020-01-13

## 2019-10-29 ENCOUNTER — FLU SHOT (OUTPATIENT)
Dept: INTERNAL MEDICINE | Facility: CLINIC | Age: 59
End: 2019-10-29

## 2019-10-29 DIAGNOSIS — Z23 FLU VACCINE NEED: ICD-10-CM

## 2019-10-29 PROCEDURE — 90674 CCIIV4 VAC NO PRSV 0.5 ML IM: CPT | Performed by: FAMILY MEDICINE

## 2019-10-29 PROCEDURE — 90471 IMMUNIZATION ADMIN: CPT | Performed by: FAMILY MEDICINE

## 2019-11-01 DIAGNOSIS — G89.4 CHRONIC PAIN SYNDROME: ICD-10-CM

## 2019-11-01 DIAGNOSIS — M15.9 PRIMARY OSTEOARTHRITIS INVOLVING MULTIPLE JOINTS: ICD-10-CM

## 2019-11-04 RX ORDER — HYDROCODONE BITARTRATE AND ACETAMINOPHEN 7.5; 325 MG/1; MG/1
1 TABLET ORAL EVERY 4 HOURS PRN
Qty: 150 TABLET | Refills: 0 | Status: SHIPPED | OUTPATIENT
Start: 2019-11-04 | End: 2019-12-03 | Stop reason: SDUPTHER

## 2019-12-03 DIAGNOSIS — M15.9 PRIMARY OSTEOARTHRITIS INVOLVING MULTIPLE JOINTS: ICD-10-CM

## 2019-12-03 DIAGNOSIS — G89.4 CHRONIC PAIN SYNDROME: ICD-10-CM

## 2019-12-04 RX ORDER — HYDROCODONE BITARTRATE AND ACETAMINOPHEN 7.5; 325 MG/1; MG/1
1 TABLET ORAL EVERY 4 HOURS PRN
Qty: 150 TABLET | Refills: 0 | Status: SHIPPED | OUTPATIENT
Start: 2019-12-04 | End: 2019-12-31 | Stop reason: SDUPTHER

## 2019-12-09 RX ORDER — VALACYCLOVIR HYDROCHLORIDE 500 MG/1
TABLET, FILM COATED ORAL
Qty: 20 TABLET | Refills: 0 | Status: SHIPPED | OUTPATIENT
Start: 2019-12-09 | End: 2020-03-06 | Stop reason: SDUPTHER

## 2019-12-12 DIAGNOSIS — M15.9 PRIMARY OSTEOARTHRITIS INVOLVING MULTIPLE JOINTS: ICD-10-CM

## 2019-12-12 DIAGNOSIS — G89.4 CHRONIC PAIN SYNDROME: ICD-10-CM

## 2019-12-17 RX ORDER — CARISOPRODOL 350 MG/1
TABLET ORAL
Qty: 120 TABLET | Refills: 2 | OUTPATIENT
Start: 2019-12-17

## 2019-12-18 DIAGNOSIS — M15.9 PRIMARY OSTEOARTHRITIS INVOLVING MULTIPLE JOINTS: ICD-10-CM

## 2019-12-18 DIAGNOSIS — G89.4 CHRONIC PAIN SYNDROME: ICD-10-CM

## 2019-12-18 RX ORDER — CARISOPRODOL 350 MG/1
TABLET ORAL
Qty: 120 TABLET | Refills: 1 | Status: SHIPPED | OUTPATIENT
Start: 2019-12-18 | End: 2020-02-17 | Stop reason: SDUPTHER

## 2019-12-31 DIAGNOSIS — M15.9 PRIMARY OSTEOARTHRITIS INVOLVING MULTIPLE JOINTS: ICD-10-CM

## 2019-12-31 DIAGNOSIS — G89.4 CHRONIC PAIN SYNDROME: ICD-10-CM

## 2019-12-31 RX ORDER — HYDROCODONE BITARTRATE AND ACETAMINOPHEN 7.5; 325 MG/1; MG/1
1 TABLET ORAL EVERY 4 HOURS PRN
Qty: 150 TABLET | Refills: 0 | Status: SHIPPED | OUTPATIENT
Start: 2019-12-31 | End: 2020-01-29 | Stop reason: SDUPTHER

## 2019-12-31 NOTE — TELEPHONE ENCOUNTER
Pt requesting  Refill Hydrocodone 7.5/325 #150  Last filled 12/03/19  Last seen 09/10/19  Contract 09/10/19  Jj 12/18/19  Next Ov 01/07/20

## 2020-01-08 ENCOUNTER — OFFICE VISIT (OUTPATIENT)
Dept: INTERNAL MEDICINE | Facility: CLINIC | Age: 60
End: 2020-01-08

## 2020-01-08 VITALS
HEART RATE: 76 BPM | RESPIRATION RATE: 16 BRPM | WEIGHT: 168.4 LBS | TEMPERATURE: 98 F | SYSTOLIC BLOOD PRESSURE: 138 MMHG | HEIGHT: 63 IN | BODY MASS INDEX: 29.84 KG/M2 | DIASTOLIC BLOOD PRESSURE: 80 MMHG | OXYGEN SATURATION: 98 %

## 2020-01-08 DIAGNOSIS — G89.4 CHRONIC PAIN SYNDROME: ICD-10-CM

## 2020-01-08 DIAGNOSIS — R73.03 PREDIABETES: ICD-10-CM

## 2020-01-08 DIAGNOSIS — Z12.39 BREAST CANCER SCREENING: ICD-10-CM

## 2020-01-08 DIAGNOSIS — F17.200 CURRENT SMOKER: ICD-10-CM

## 2020-01-08 DIAGNOSIS — Z12.11 COLON CANCER SCREENING: ICD-10-CM

## 2020-01-08 DIAGNOSIS — I10 ESSENTIAL HYPERTENSION: ICD-10-CM

## 2020-01-08 DIAGNOSIS — F41.9 ANXIETY: Primary | ICD-10-CM

## 2020-01-08 PROCEDURE — 99214 OFFICE O/P EST MOD 30 MIN: CPT | Performed by: NURSE PRACTITIONER

## 2020-01-08 NOTE — ASSESSMENT & PLAN NOTE
Hypertension is unchanged.  Dietary sodium restriction.  Weight loss.  Regular aerobic exercise.  Stop smoking.  Blood pressure will be reassessed at the next regular appointment.    She refuses treatment at this time. Aware of risks of untreated hypertension.

## 2020-01-08 NOTE — PROGRESS NOTES
Name: Marina Sotelo  :  1960    Subjective:      Chief Complaint   Patient presents with   • Anxiety   • Pain        Marina Sotelo is a 59 y.o. female prior patient of Dane Galaviz MD. Dr Galaviz has now retired and she is here to establish care with me.  She has multiple chronic medical conditions including:  Anxiety, depression, arthritis   She is new to me.     She has chronic anxiety and depression.  She has been going through a lot with her  lately.  She states she has been controlled on cymbalta and xanax.  No SOA, CP         She has chronic OA in multiple joints and fibromyalgia  and has been on norco for over 3 years.  She states she is able to work with norco otherwise she is unable related to pain. She does complain of fatigue at times.  She's not sure if this is medical related or because so many others rely upon her.      Mother  at 47 yoa of colon cancer.  Pt states she is due for next and needs a referral. She denies change of bowel habits. Doesn't recall her last results.  Only that she woke up during the procedure.     She is prediabetic.  States she has switched to diet sodas but still eats a lot of carbs and aware she needs to cut down.  No family hx of DM.     She has not had a mammogram for over 2 years.  No breast changes.     She has recently left her . States making better choices.   Current smoker - not willing to quit yet.   Cleans houses for work.     Not sexually active    I have reviewed the patient's medical history in detail and updated the computerized patient record.    Past Medical History:   Diagnosis Date   • Anxiety    • Arthritis    • Cervical disc disease with myelopathy 2019   • Chronic pain    • Depression, acute     recurrent   • Fibromyalgia, primary    • Hypertension    • Left hip pain     c/o pain in left hip       Past Surgical History:   Procedure Laterality Date   • TUBAL ABDOMINAL LIGATION         Family History      Problem Relation Age of Onset   • Cancer Mother         Colon  at 47 yoa    • Cancer Father         Brain   • Cancer Sister         Pancreatic   • No Known Problems Brother    • No Known Problems Maternal Aunt    • No Known Problems Maternal Uncle    • No Known Problems Paternal Aunt    • No Known Problems Paternal Uncle    • No Known Problems Maternal Grandmother    • No Known Problems Maternal Grandfather    • No Known Problems Paternal Grandmother    • No Known Problems Paternal Grandfather    • Anesthesia problems Neg Hx    • Broken bones Neg Hx    • Clotting disorder Neg Hx    • Collagen disease Neg Hx    • Diabetes Neg Hx    • Dislocations Neg Hx    • Osteoporosis Neg Hx    • Rheumatologic disease Neg Hx    • Scoliosis Neg Hx    • Severe sprains Neg Hx        Social History     Socioeconomic History   • Marital status:      Spouse name: Not on file   • Number of children: Not on file   • Years of education: Not on file   • Highest education level: Not on file   Tobacco Use   • Smoking status: Current Every Day Smoker   • Smokeless tobacco: Never Used   Substance and Sexual Activity   • Alcohol use: Yes     Comment: occasional   • Drug use: Yes     Types: Hydrocodone   • Sexual activity: Defer       Most Recent Immunizations   Administered Date(s) Administered   • Flu Vaccine Quad PF >36MO 10/09/2017   • Influenza TIV (IM) 10/28/2016   • Influenza, Unspecified 2015   • Tdap 2010   • flucelvax quad pfs =>4 YRS 10/29/2019         Review of Systems:   Review of Systems   Constitutional: Positive for fatigue.   Respiratory: Negative for cough and shortness of breath.    Cardiovascular: Negative for chest pain, palpitations and leg swelling.   Gastrointestinal: Negative for anal bleeding, blood in stool, constipation and diarrhea.   Endocrine: Negative.    Genitourinary: Negative.    Musculoskeletal: Positive for arthralgias. Negative for joint swelling.   Neurological: Negative.   "  Psychiatric/Behavioral: Negative for sleep disturbance and suicidal ideas. The patient is nervous/anxious.          Objective:      Physical Exam:   Physical Exam   Constitutional: She is oriented to person, place, and time. She appears well-developed. She is cooperative.   HENT:   Mouth/Throat: Oropharynx is clear and moist.   Eyes: Conjunctivae are normal.   Neck: Normal range of motion. No thyromegaly present.   Cardiovascular: Normal rate, regular rhythm, normal heart sounds, intact distal pulses and normal pulses.   Pulmonary/Chest: Effort normal and breath sounds normal. She exhibits no deformity.   Equal, Unlabored   Abdominal: Soft. Bowel sounds are normal.   Musculoskeletal: Normal range of motion. She exhibits no edema.   Neurological: She is alert and oriented to person, place, and time.   Skin: Skin is warm and dry. Capillary refill takes 2 to 3 seconds.   Psychiatric: She has a normal mood and affect.   Vitals reviewed.        Vital Signs:  Vitals:    01/08/20 1341 01/08/20 1400   BP: 154/91 138/80   BP Location: Left arm Left arm   Patient Position: Sitting    Cuff Size: Small Adult    Pulse: 76    Resp: 16    Temp: 98 °F (36.7 °C)    TempSrc: Oral    SpO2: 98%    Weight: 76.4 kg (168 lb 6.4 oz)    Height: 160 cm (63\")      Body mass index is 29.83 kg/m².      Results Review:      REVIEWED AND DISCUSSED LAB RESULTS WITH PATIENT      Requested Prescriptions      No prescriptions requested or ordered in this encounter     Routine medications provided by this office will also be refilled via pharmacy request.       Current Outpatient Medications:   •  ALPRAZolam (XANAX) 0.5 MG tablet, TAKE 1 TABLET BY MOUTH TWICE A DAY, Disp: 60 tablet, Rfl: 2  •  betamethasone valerate (VALISONE) 0.1 % cream, Apply  topically to the appropriate area as directed 2 (Two) Times a Day., Disp: 45 g, Rfl: 5  •  carisoprodol (SOMA) 350 MG tablet, TAKE 1 TABLET BY MOUTH FOUR TIMES A DAY AS NEEDED FOR MUSCLE SPASMS, Disp: 120 " tablet, Rfl: 1  •  cetirizine (ZyrTEC) 10 MG tablet, Take 10 mg by mouth daily., Disp: , Rfl:   •  DULoxetine (CYMBALTA) 60 MG capsule, TAKE 2 CAPSULES BY MOUTH EVERY DAY, Disp: 60 capsule, Rfl: 5  •  halobetasol (ULTRAVATE) 0.05 % cream, APPLY  CREAM TOPICALLY TWICE DAILY, Disp: 50 g, Rfl: 0  •  HYDROcodone-acetaminophen (NORCO) 7.5-325 MG per tablet, Take 1 tablet by mouth Every 4 (Four) Hours As Needed for Moderate Pain . Indications: EARLY  REQUEST 4/13/16, Disp: 150 tablet, Rfl: 0  •  meloxicam (MOBIC) 15 MG tablet, Take 1 tablet by mouth Daily., Disp: 30 tablet, Rfl: 5  •  montelukast (SINGULAIR) 10 MG tablet, TAKE 1 TABLET BY MOUTH EVERY DAY AT NIGHT, Disp: 30 tablet, Rfl: 11  •  mupirocin (BACTROBAN) 2 % ointment, Apply  topically 3 (Three) Times a Day., Disp: 22 g, Rfl: 5  •  valACYclovir (VALTREX) 500 MG tablet, TAKE 1 TABLET BY MOUTH TWICE A DAY, Disp: 20 tablet, Rfl: 0  •  mupirocin (BACTROBAN) 2 % ointment, Apply  topically to the appropriate area as directed 2 (Two) Times a Day., Disp: 30 g, Rfl: 5       Assessment and Plan:        Problem List Items Addressed This Visit        Cardiovascular and Mediastinum    Essential hypertension     Hypertension is unchanged.  Dietary sodium restriction.  Weight loss.  Regular aerobic exercise.  Stop smoking.  Blood pressure will be reassessed at the next regular appointment.    She refuses treatment at this time. Aware of risks of untreated hypertension.          Relevant Orders    Comprehensive Metabolic Panel    Lipid Panel With LDL / HDL Ratio    CBC (No Diff)    Hemoglobin A1c    TSH       Nervous and Auditory    Chronic pain syndrome       Other    Anxiety - Primary    Relevant Orders    TSH    Current smoker    Prediabetes     Work on exercise and cut down on carbs   Check A1C today          Relevant Orders    Comprehensive Metabolic Panel    Lipid Panel With LDL / HDL Ratio    CBC (No Diff)    Hemoglobin A1c    TSH      Other Visit Diagnoses      "Colon cancer screening        Relevant Orders    Ambulatory Referral For Screening Colonoscopy    Breast cancer screening        Relevant Orders    Mammo screening digital tomosynthesis bilateral w CAD             Discussed any change in Rx and discussed visit with patient.  All questions answered.      Return in about 3 months (around 4/8/2020), or rachidco .    Diego \"Sukumar\" Alberta, APRN   01/08/20    Dragon disclaimer:   Much of this encounter note is an electronic transcription/translation of spoken language to printed text. The electronic translation of spoken language may permit erroneous, or at times, nonsensical words or phrases to be inadvertently transcribed; Although I have reviewed the note for such errors, some may still exist.     Additional Patient Counseling:       There are no Patient Instructions on file for this visit.  "

## 2020-01-09 LAB
ALBUMIN SERPL-MCNC: 4.8 G/DL (ref 3.5–5.2)
ALBUMIN/GLOB SERPL: 2 G/DL
ALP SERPL-CCNC: 65 U/L (ref 39–117)
ALT SERPL-CCNC: 26 U/L (ref 1–33)
AST SERPL-CCNC: 23 U/L (ref 1–32)
BILIRUB SERPL-MCNC: 0.2 MG/DL (ref 0.2–1.2)
BUN SERPL-MCNC: 15 MG/DL (ref 6–20)
BUN/CREAT SERPL: 15.6 (ref 7–25)
CALCIUM SERPL-MCNC: 9.6 MG/DL (ref 8.6–10.5)
CHLORIDE SERPL-SCNC: 99 MMOL/L (ref 98–107)
CHOLEST SERPL-MCNC: 244 MG/DL (ref 0–200)
CO2 SERPL-SCNC: 27.6 MMOL/L (ref 22–29)
CREAT SERPL-MCNC: 0.96 MG/DL (ref 0.57–1)
ERYTHROCYTE [DISTWIDTH] IN BLOOD BY AUTOMATED COUNT: 13.5 % (ref 12.3–15.4)
GLOBULIN SER CALC-MCNC: 2.4 GM/DL
GLUCOSE SERPL-MCNC: 91 MG/DL (ref 65–99)
HBA1C MFR BLD: 6 % (ref 4.8–5.6)
HCT VFR BLD AUTO: 39.3 % (ref 34–46.6)
HDLC SERPL-MCNC: 53 MG/DL (ref 40–60)
HGB BLD-MCNC: 13.3 G/DL (ref 12–15.9)
LDLC SERPL CALC-MCNC: 167 MG/DL (ref 0–100)
LDLC/HDLC SERPL: 3.16 {RATIO}
MCH RBC QN AUTO: 32 PG (ref 26.6–33)
MCHC RBC AUTO-ENTMCNC: 33.8 G/DL (ref 31.5–35.7)
MCV RBC AUTO: 94.5 FL (ref 79–97)
PLATELET # BLD AUTO: 366 10*3/MM3 (ref 140–450)
POTASSIUM SERPL-SCNC: 4.8 MMOL/L (ref 3.5–5.2)
PROT SERPL-MCNC: 7.2 G/DL (ref 6–8.5)
RBC # BLD AUTO: 4.16 10*6/MM3 (ref 3.77–5.28)
SODIUM SERPL-SCNC: 139 MMOL/L (ref 136–145)
TRIGL SERPL-MCNC: 118 MG/DL (ref 0–150)
TSH SERPL DL<=0.005 MIU/L-ACNC: 4.46 UIU/ML (ref 0.27–4.2)
VLDLC SERPL CALC-MCNC: 23.6 MG/DL
WBC # BLD AUTO: 9.52 10*3/MM3 (ref 3.4–10.8)

## 2020-01-09 NOTE — PROGRESS NOTES
Notify patient that she is still in pre-diabetes range.  Her A1C has increased to 6%  Continue to work on cutting sugar and carbohydrates and do more exercise.

## 2020-01-12 DIAGNOSIS — F41.9 ANXIETY: ICD-10-CM

## 2020-01-13 RX ORDER — ALPRAZOLAM 0.5 MG/1
TABLET ORAL
Qty: 60 TABLET | Refills: 0 | Status: SHIPPED | OUTPATIENT
Start: 2020-01-13 | End: 2020-02-12

## 2020-01-29 DIAGNOSIS — G89.4 CHRONIC PAIN SYNDROME: ICD-10-CM

## 2020-01-29 DIAGNOSIS — M15.9 PRIMARY OSTEOARTHRITIS INVOLVING MULTIPLE JOINTS: ICD-10-CM

## 2020-01-29 RX ORDER — HYDROCODONE BITARTRATE AND ACETAMINOPHEN 7.5; 325 MG/1; MG/1
1 TABLET ORAL EVERY 4 HOURS PRN
Qty: 150 TABLET | Refills: 0 | Status: SHIPPED | OUTPATIENT
Start: 2020-01-29 | End: 2020-02-27 | Stop reason: SDUPTHER

## 2020-01-31 ENCOUNTER — HOSPITAL ENCOUNTER (OUTPATIENT)
Dept: MAMMOGRAPHY | Facility: HOSPITAL | Age: 60
Discharge: HOME | End: 2020-01-31
Admitting: NURSE PRACTITIONER

## 2020-01-31 DIAGNOSIS — Z12.39 BREAST CANCER SCREENING: ICD-10-CM

## 2020-01-31 PROCEDURE — 77063 BREAST TOMOSYNTHESIS BI: CPT

## 2020-01-31 PROCEDURE — 77067 SCR MAMMO BI INCL CAD: CPT

## 2020-02-03 RX ORDER — DULOXETIN HYDROCHLORIDE 60 MG/1
CAPSULE, DELAYED RELEASE ORAL
Qty: 60 CAPSULE | Refills: 5 | Status: SHIPPED | OUTPATIENT
Start: 2020-02-03 | End: 2020-02-19 | Stop reason: SDUPTHER

## 2020-02-10 ENCOUNTER — OFFICE VISIT (OUTPATIENT)
Dept: INTERNAL MEDICINE | Facility: CLINIC | Age: 60
End: 2020-02-10

## 2020-02-10 VITALS
TEMPERATURE: 97.8 F | OXYGEN SATURATION: 96 % | WEIGHT: 174 LBS | BODY MASS INDEX: 30.83 KG/M2 | HEART RATE: 91 BPM | RESPIRATION RATE: 16 BRPM | HEIGHT: 63 IN | DIASTOLIC BLOOD PRESSURE: 84 MMHG | SYSTOLIC BLOOD PRESSURE: 143 MMHG

## 2020-02-10 DIAGNOSIS — H65.113 NON-RECURRENT ACUTE ALLERGIC OTITIS MEDIA OF BOTH EARS: ICD-10-CM

## 2020-02-10 DIAGNOSIS — J01.10 ACUTE NON-RECURRENT FRONTAL SINUSITIS: Primary | ICD-10-CM

## 2020-02-10 PROCEDURE — 99213 OFFICE O/P EST LOW 20 MIN: CPT | Performed by: NURSE PRACTITIONER

## 2020-02-10 RX ORDER — AMOXICILLIN AND CLAVULANATE POTASSIUM 875; 125 MG/1; MG/1
1 TABLET, FILM COATED ORAL 2 TIMES DAILY
Qty: 20 TABLET | Refills: 0 | Status: SHIPPED | OUTPATIENT
Start: 2020-02-10 | End: 2020-02-20

## 2020-02-10 RX ORDER — FLUTICASONE PROPIONATE 50 MCG
2 SPRAY, SUSPENSION (ML) NASAL DAILY
Qty: 1 BOTTLE | Refills: 0 | Status: SHIPPED | OUTPATIENT
Start: 2020-02-10 | End: 2020-03-04

## 2020-02-10 NOTE — PROGRESS NOTES
Name: Marina Sotelo  :  1960    Subjective:      Chief Complaint   Patient presents with   • Headache   • Earache        Marina Sotelo is a 59 y.o. female who is here for sinus and ear pain.     Earache    There is pain in both ears. This is a new problem. The current episode started in the past 7 days. The problem occurs constantly. The problem has been waxing and waning. The maximum temperature recorded prior to her arrival was 102 - 102.9 F. The fever has been present for 1 to 2 days. The pain is at a severity of 6/10. The pain is moderate. Associated symptoms include headaches and rhinorrhea. Pertinent negatives include no coughing, ear discharge, neck pain, rash, sore throat or vomiting. She has tried cold packs for the symptoms. The treatment provided mild relief.   Sinusitis   This is a new problem. The current episode started 1 to 4 weeks ago. The problem has been gradually worsening (was getting better, then rapidly worse) since onset. The maximum temperature recorded prior to her arrival was 102 - 102.9 F. The fever has been present for 1 to 2 days. Her pain is at a severity of 7/10. The pain is moderate. Associated symptoms include ear pain, headaches, sinus pressure and sneezing. Pertinent negatives include no congestion, coughing, neck pain, shortness of breath, sore throat or swollen glands. (Pain across forehead and at top of gums (has upper dentures - no recent procedures) ) Past treatments include oral decongestants and acetaminophen (ice packs, lavender steam ). The treatment provided mild relief.       I have reviewed the patient's medical history in detail and updated the computerized patient record.    Past Medical History:   Diagnosis Date   • Anxiety    • Arthritis    • Cervical disc disease with myelopathy 2019   • Chronic pain    • Depression, acute     recurrent   • Fibromyalgia, primary    • Hypertension    • Left hip pain     c/o pain in left hip       Past  Surgical History:   Procedure Laterality Date   • TUBAL ABDOMINAL LIGATION         Family History   Problem Relation Age of Onset   • Cancer Mother         Colon  at 47 yoa    • Cancer Father         Brain   • Cancer Sister         Pancreatic   • No Known Problems Brother    • No Known Problems Maternal Aunt    • No Known Problems Maternal Uncle    • No Known Problems Paternal Aunt    • No Known Problems Paternal Uncle    • No Known Problems Maternal Grandmother    • No Known Problems Maternal Grandfather    • Breast cancer Paternal Grandmother    • No Known Problems Paternal Grandfather    • Anesthesia problems Neg Hx    • Broken bones Neg Hx    • Clotting disorder Neg Hx    • Collagen disease Neg Hx    • Diabetes Neg Hx    • Dislocations Neg Hx    • Osteoporosis Neg Hx    • Rheumatologic disease Neg Hx    • Scoliosis Neg Hx    • Severe sprains Neg Hx        Social History     Socioeconomic History   • Marital status:      Spouse name: Not on file   • Number of children: Not on file   • Years of education: Not on file   • Highest education level: Not on file   Tobacco Use   • Smoking status: Current Every Day Smoker   • Smokeless tobacco: Never Used   Substance and Sexual Activity   • Alcohol use: Yes     Comment: occasional   • Drug use: Yes     Types: Hydrocodone   • Sexual activity: Defer       Most Recent Immunizations   Administered Date(s) Administered   • Flu Vaccine Quad PF >36MO 10/09/2017   • Influenza TIV (IM) 10/28/2016   • Influenza, Unspecified 2015   • Tdap 2010   • flucelvax quad pfs =>4 YRS 10/29/2019         Review of Systems:   Review of Systems   Constitutional: Positive for fever.   HENT: Positive for ear pain, rhinorrhea, sinus pressure and sneezing. Negative for congestion, ear discharge and sore throat.    Respiratory: Negative for cough and shortness of breath.    Cardiovascular: Negative for chest pain.   Gastrointestinal: Negative for vomiting.   Musculoskeletal:  "Negative for neck pain.   Skin: Negative for rash.   Neurological: Positive for headaches.         Objective:      Physical Exam:   Physical Exam   Constitutional: She is oriented to person, place, and time. She appears well-developed. She is cooperative.   HENT:   Right Ear: Tympanic membrane is injected. A middle ear effusion is present.   Left Ear: Tympanic membrane is injected. A middle ear effusion is present.   Nose: Rhinorrhea present. Right sinus exhibits maxillary sinus tenderness and frontal sinus tenderness. Left sinus exhibits maxillary sinus tenderness and frontal sinus tenderness.   Mouth/Throat: No oropharyngeal exudate, posterior oropharyngeal edema, posterior oropharyngeal erythema or tonsillar abscesses.   Can not vasovagal    Neck: Neck supple.   Cardiovascular: Normal rate, regular rhythm, normal heart sounds, intact distal pulses and normal pulses.   Pulmonary/Chest: Effort normal and breath sounds normal. She exhibits no deformity.   Equal, Unlabored   Abdominal: Soft. Bowel sounds are normal.   Lymphadenopathy:     She has no cervical adenopathy.   Neurological: She is alert and oriented to person, place, and time.   Skin: Skin is warm and dry. Capillary refill takes 2 to 3 seconds.   Psychiatric: She has a normal mood and affect.   Vitals reviewed.        Vital Signs:  Vitals:    02/10/20 0920   BP: 143/84   BP Location: Left arm   Patient Position: Sitting   Cuff Size: Small Adult   Pulse: 91   Resp: 16   Temp: 97.8 °F (36.6 °C)   TempSrc: Oral   SpO2: 96%   Weight: 78.9 kg (174 lb)   Height: 160 cm (63\")     Body mass index is 30.82 kg/m².            Requested Prescriptions     Signed Prescriptions Disp Refills   • amoxicillin-clavulanate (AUGMENTIN) 875-125 MG per tablet 20 tablet 0     Sig: Take 1 tablet by mouth 2 (Two) Times a Day for 10 days.   • fluticasone (FLONASE) 50 MCG/ACT nasal spray 1 bottle 0     Si sprays into the nostril(s) as directed by provider Daily.     Routine " medications provided by this office will also be refilled via pharmacy request.       Current Outpatient Medications:   •  ALPRAZolam (XANAX) 0.5 MG tablet, TAKE 1 TABLET BY MOUTH TWICE A DAY, Disp: 60 tablet, Rfl: 0  •  betamethasone valerate (VALISONE) 0.1 % cream, Apply  topically to the appropriate area as directed 2 (Two) Times a Day., Disp: 45 g, Rfl: 5  •  carisoprodol (SOMA) 350 MG tablet, TAKE 1 TABLET BY MOUTH FOUR TIMES A DAY AS NEEDED FOR MUSCLE SPASMS, Disp: 120 tablet, Rfl: 1  •  cetirizine (ZyrTEC) 10 MG tablet, Take 10 mg by mouth daily., Disp: , Rfl:   •  DULoxetine (CYMBALTA) 60 MG capsule, TAKE 2 CAPSULES BY MOUTH EVERY DAY, Disp: 60 capsule, Rfl: 5  •  halobetasol (ULTRAVATE) 0.05 % cream, APPLY  CREAM TOPICALLY TWICE DAILY, Disp: 50 g, Rfl: 0  •  HYDROcodone-acetaminophen (NORCO) 7.5-325 MG per tablet, Take 1 tablet by mouth Every 4 (Four) Hours As Needed for Moderate Pain . Indications: EARLY  REQUEST 4/13/16, Disp: 150 tablet, Rfl: 0  •  meloxicam (MOBIC) 15 MG tablet, Take 1 tablet by mouth Daily., Disp: 30 tablet, Rfl: 5  •  montelukast (SINGULAIR) 10 MG tablet, TAKE 1 TABLET BY MOUTH EVERY DAY AT NIGHT, Disp: 30 tablet, Rfl: 11  •  mupirocin (BACTROBAN) 2 % ointment, Apply  topically 3 (Three) Times a Day., Disp: 22 g, Rfl: 5  •  valACYclovir (VALTREX) 500 MG tablet, TAKE 1 TABLET BY MOUTH TWICE A DAY, Disp: 20 tablet, Rfl: 0  •  amoxicillin-clavulanate (AUGMENTIN) 875-125 MG per tablet, Take 1 tablet by mouth 2 (Two) Times a Day for 10 days., Disp: 20 tablet, Rfl: 0  •  fluticasone (FLONASE) 50 MCG/ACT nasal spray, 2 sprays into the nostril(s) as directed by provider Daily., Disp: 1 bottle, Rfl: 0       Assessment and Plan:        Problem List Items Addressed This Visit     None      Visit Diagnoses     Acute non-recurrent frontal sinusitis    -  Primary    Relevant Medications    amoxicillin-clavulanate (AUGMENTIN) 875-125 MG per tablet    Non-recurrent acute allergic otitis media  "of both ears        Relevant Medications    fluticasone (FLONASE) 50 MCG/ACT nasal spray           New - had fever and has gotten worse.  Will add ABX (she has tolerated amoxicillin as recently as 2018).    Continue warm vapor steams and mucinex at home.    Lots of fluids.     Discussed any change in Rx and discussed visit with patient.  All questions answered.      Return if symptoms worsen or fail to improve.    Diego \"Sukumar\" Alberta, APRN   02/10/20    Dragon disclaimer:   Much of this encounter note is an electronic transcription/translation of spoken language to printed text. The electronic translation of spoken language may permit erroneous, or at times, nonsensical words or phrases to be inadvertently transcribed; Although I have reviewed the note for such errors, some may still exist.     Additional Patient Counseling:       Patient Instructions   Sinusitis, Adult  Sinusitis is inflammation of your sinuses. Sinuses are hollow spaces in the bones around your face. Your sinuses are located:  · Around your eyes.  · In the middle of your forehead.  · Behind your nose.  · In your cheekbones.  Mucus normally drains out of your sinuses. When your nasal tissues become inflamed or swollen, mucus can become trapped or blocked. This allows bacteria, viruses, and fungi to grow, which leads to infection. Most infections of the sinuses are caused by a virus.  Sinusitis can develop quickly. It can last for up to 4 weeks (acute) or for more than 12 weeks (chronic). Sinusitis often develops after a cold.  What are the causes?  This condition is caused by anything that creates swelling in the sinuses or stops mucus from draining. This includes:  · Allergies.  · Asthma.  · Infection from bacteria or viruses.  · Deformities or blockages in your nose or sinuses.  · Abnormal growths in the nose (nasal polyps).  · Pollutants, such as chemicals or irritants in the air.  · Infection from fungi (rare).  What increases the risk?  You " are more likely to develop this condition if you:  · Have a weak body defense system (immune system).  · Do a lot of swimming or diving.  · Overuse nasal sprays.  · Smoke.  What are the signs or symptoms?  The main symptoms of this condition are pain and a feeling of pressure around the affected sinuses. Other symptoms include:  · Stuffy nose or congestion.  · Thick drainage from your nose.  · Swelling and warmth over the affected sinuses.  · Headache.  · Upper toothache.  · A cough that may get worse at night.  · Extra mucus that collects in the throat or the back of the nose (postnasal drip).  · Decreased sense of smell and taste.  · Fatigue.  · A fever.  · Sore throat.  · Bad breath.  How is this diagnosed?  This condition is diagnosed based on:  · Your symptoms.  · Your medical history.  · A physical exam.  · Tests to find out if your condition is acute or chronic. This may include:  ? Checking your nose for nasal polyps.  ? Viewing your sinuses using a device that has a light (endoscope).  ? Testing for allergies or bacteria.  ? Imaging tests, such as an MRI or CT scan.  In rare cases, a bone biopsy may be done to rule out more serious types of fungal sinus disease.  How is this treated?  Treatment for sinusitis depends on the cause and whether your condition is chronic or acute.  · If caused by a virus, your symptoms should go away on their own within 10 days. You may be given medicines to relieve symptoms. They include:  ? Medicines that shrink swollen nasal passages (topical intranasal decongestants).  ? Medicines that treat allergies (antihistamines).  ? A spray that eases inflammation of the nostrils (topical intranasal corticosteroids).  ? Rinses that help get rid of thick mucus in your nose (nasal saline washes).  · If caused by bacteria, your health care provider may recommend waiting to see if your symptoms improve. Most bacterial infections will get better without antibiotic medicine. You may be given  antibiotics if you have:  ? A severe infection.  ? A weak immune system.  · If caused by narrow nasal passages or nasal polyps, you may need to have surgery.  Follow these instructions at home:  Medicines  · Take, use, or apply over-the-counter and prescription medicines only as told by your health care provider. These may include nasal sprays.  · If you were prescribed an antibiotic medicine, take it as told by your health care provider. Do not stop taking the antibiotic even if you start to feel better.  Hydrate and humidify    · Drink enough fluid to keep your urine pale yellow. Staying hydrated will help to thin your mucus.  · Use a cool mist humidifier to keep the humidity level in your home above 50%.  · Inhale steam for 10-15 minutes, 3-4 times a day, or as told by your health care provider. You can do this in the bathroom while a hot shower is running.  · Limit your exposure to cool or dry air.  Rest  · Rest as much as possible.  · Sleep with your head raised (elevated).  · Make sure you get enough sleep each night.  General instructions    · Apply a warm, moist washcloth to your face 3-4 times a day or as told by your health care provider. This will help with discomfort.  · Wash your hands often with soap and water to reduce your exposure to germs. If soap and water are not available, use hand .  · Do not smoke. Avoid being around people who are smoking (secondhand smoke).  · Keep all follow-up visits as told by your health care provider. This is important.  Contact a health care provider if:  · You have a fever.  · Your symptoms get worse.  · Your symptoms do not improve within 10 days.  Get help right away if:  · You have a severe headache.  · You have persistent vomiting.  · You have severe pain or swelling around your face or eyes.  · You have vision problems.  · You develop confusion.  · Your neck is stiff.  · You have trouble breathing.  Summary  · Sinusitis is soreness and inflammation of  your sinuses. Sinuses are hollow spaces in the bones around your face.  · This condition is caused by nasal tissues that become inflamed or swollen. The swelling traps or blocks the flow of mucus. This allows bacteria, viruses, and fungi to grow, which leads to infection.  · If you were prescribed an antibiotic medicine, take it as told by your health care provider. Do not stop taking the antibiotic even if you start to feel better.  · Keep all follow-up visits as told by your health care provider. This is important.  This information is not intended to replace advice given to you by your health care provider. Make sure you discuss any questions you have with your health care provider.  Document Released: 12/18/2006 Document Revised: 05/20/2019 Document Reviewed: 05/20/2019  [employer] Interactive Patient Education © 2019 [employer]

## 2020-02-10 NOTE — PATIENT INSTRUCTIONS
Sinusitis, Adult  Sinusitis is inflammation of your sinuses. Sinuses are hollow spaces in the bones around your face. Your sinuses are located:  · Around your eyes.  · In the middle of your forehead.  · Behind your nose.  · In your cheekbones.  Mucus normally drains out of your sinuses. When your nasal tissues become inflamed or swollen, mucus can become trapped or blocked. This allows bacteria, viruses, and fungi to grow, which leads to infection. Most infections of the sinuses are caused by a virus.  Sinusitis can develop quickly. It can last for up to 4 weeks (acute) or for more than 12 weeks (chronic). Sinusitis often develops after a cold.  What are the causes?  This condition is caused by anything that creates swelling in the sinuses or stops mucus from draining. This includes:  · Allergies.  · Asthma.  · Infection from bacteria or viruses.  · Deformities or blockages in your nose or sinuses.  · Abnormal growths in the nose (nasal polyps).  · Pollutants, such as chemicals or irritants in the air.  · Infection from fungi (rare).  What increases the risk?  You are more likely to develop this condition if you:  · Have a weak body defense system (immune system).  · Do a lot of swimming or diving.  · Overuse nasal sprays.  · Smoke.  What are the signs or symptoms?  The main symptoms of this condition are pain and a feeling of pressure around the affected sinuses. Other symptoms include:  · Stuffy nose or congestion.  · Thick drainage from your nose.  · Swelling and warmth over the affected sinuses.  · Headache.  · Upper toothache.  · A cough that may get worse at night.  · Extra mucus that collects in the throat or the back of the nose (postnasal drip).  · Decreased sense of smell and taste.  · Fatigue.  · A fever.  · Sore throat.  · Bad breath.  How is this diagnosed?  This condition is diagnosed based on:  · Your symptoms.  · Your medical history.  · A physical exam.  · Tests to find out if your condition is  acute or chronic. This may include:  ? Checking your nose for nasal polyps.  ? Viewing your sinuses using a device that has a light (endoscope).  ? Testing for allergies or bacteria.  ? Imaging tests, such as an MRI or CT scan.  In rare cases, a bone biopsy may be done to rule out more serious types of fungal sinus disease.  How is this treated?  Treatment for sinusitis depends on the cause and whether your condition is chronic or acute.  · If caused by a virus, your symptoms should go away on their own within 10 days. You may be given medicines to relieve symptoms. They include:  ? Medicines that shrink swollen nasal passages (topical intranasal decongestants).  ? Medicines that treat allergies (antihistamines).  ? A spray that eases inflammation of the nostrils (topical intranasal corticosteroids).  ? Rinses that help get rid of thick mucus in your nose (nasal saline washes).  · If caused by bacteria, your health care provider may recommend waiting to see if your symptoms improve. Most bacterial infections will get better without antibiotic medicine. You may be given antibiotics if you have:  ? A severe infection.  ? A weak immune system.  · If caused by narrow nasal passages or nasal polyps, you may need to have surgery.  Follow these instructions at home:  Medicines  · Take, use, or apply over-the-counter and prescription medicines only as told by your health care provider. These may include nasal sprays.  · If you were prescribed an antibiotic medicine, take it as told by your health care provider. Do not stop taking the antibiotic even if you start to feel better.  Hydrate and humidify    · Drink enough fluid to keep your urine pale yellow. Staying hydrated will help to thin your mucus.  · Use a cool mist humidifier to keep the humidity level in your home above 50%.  · Inhale steam for 10-15 minutes, 3-4 times a day, or as told by your health care provider. You can do this in the bathroom while a hot shower is  running.  · Limit your exposure to cool or dry air.  Rest  · Rest as much as possible.  · Sleep with your head raised (elevated).  · Make sure you get enough sleep each night.  General instructions    · Apply a warm, moist washcloth to your face 3-4 times a day or as told by your health care provider. This will help with discomfort.  · Wash your hands often with soap and water to reduce your exposure to germs. If soap and water are not available, use hand .  · Do not smoke. Avoid being around people who are smoking (secondhand smoke).  · Keep all follow-up visits as told by your health care provider. This is important.  Contact a health care provider if:  · You have a fever.  · Your symptoms get worse.  · Your symptoms do not improve within 10 days.  Get help right away if:  · You have a severe headache.  · You have persistent vomiting.  · You have severe pain or swelling around your face or eyes.  · You have vision problems.  · You develop confusion.  · Your neck is stiff.  · You have trouble breathing.  Summary  · Sinusitis is soreness and inflammation of your sinuses. Sinuses are hollow spaces in the bones around your face.  · This condition is caused by nasal tissues that become inflamed or swollen. The swelling traps or blocks the flow of mucus. This allows bacteria, viruses, and fungi to grow, which leads to infection.  · If you were prescribed an antibiotic medicine, take it as told by your health care provider. Do not stop taking the antibiotic even if you start to feel better.  · Keep all follow-up visits as told by your health care provider. This is important.  This information is not intended to replace advice given to you by your health care provider. Make sure you discuss any questions you have with your health care provider.  Document Released: 12/18/2006 Document Revised: 05/20/2019 Document Reviewed: 05/20/2019  [employer] Interactive Patient Education © 2019 [employer] Inc.

## 2020-02-12 DIAGNOSIS — F41.9 ANXIETY: ICD-10-CM

## 2020-02-12 RX ORDER — ALPRAZOLAM 0.5 MG/1
TABLET ORAL
Qty: 60 TABLET | Refills: 0 | Status: SHIPPED | OUTPATIENT
Start: 2020-02-12 | End: 2020-03-11 | Stop reason: SDUPTHER

## 2020-02-14 DIAGNOSIS — F41.9 ANXIETY: ICD-10-CM

## 2020-02-14 DIAGNOSIS — M15.9 PRIMARY OSTEOARTHRITIS INVOLVING MULTIPLE JOINTS: ICD-10-CM

## 2020-02-14 DIAGNOSIS — G89.4 CHRONIC PAIN SYNDROME: ICD-10-CM

## 2020-02-14 RX ORDER — CARISOPRODOL 350 MG/1
TABLET ORAL
Qty: 120 TABLET | Refills: 1 | Status: CANCELLED | OUTPATIENT
Start: 2020-02-14

## 2020-02-14 NOTE — TELEPHONE ENCOUNTER
PATIENT NEEDS A REFILL  ALPRAZolam (XANAX) 0.5 MG tablet  carisoprodol (SOMA) 350 MG tablet  [facility] [address] [facility] CONFIRMED

## 2020-02-17 DIAGNOSIS — G89.4 CHRONIC PAIN SYNDROME: ICD-10-CM

## 2020-02-17 DIAGNOSIS — M15.9 PRIMARY OSTEOARTHRITIS INVOLVING MULTIPLE JOINTS: ICD-10-CM

## 2020-02-17 RX ORDER — CARISOPRODOL 350 MG/1
TABLET ORAL
Qty: 120 TABLET | Refills: 1 | Status: SHIPPED | OUTPATIENT
Start: 2020-02-17 | End: 2020-03-27 | Stop reason: SDUPTHER

## 2020-02-17 NOTE — TELEPHONE ENCOUNTER
Pt called and requested a refill for carisoprodol (SOMA) 350 MG tablet and the pharmacy says they never received it, please advise. [facility]

## 2020-02-18 RX ORDER — ALPRAZOLAM 0.5 MG/1
0.5 TABLET ORAL 2 TIMES DAILY
Qty: 60 TABLET | Refills: 0 | OUTPATIENT
Start: 2020-02-18

## 2020-02-19 RX ORDER — DULOXETIN HYDROCHLORIDE 60 MG/1
120 CAPSULE, DELAYED RELEASE ORAL DAILY
Qty: 60 CAPSULE | Refills: 5 | Status: SHIPPED | OUTPATIENT
Start: 2020-02-19 | End: 2020-03-27 | Stop reason: SDUPTHER

## 2020-02-27 DIAGNOSIS — G89.4 CHRONIC PAIN SYNDROME: ICD-10-CM

## 2020-02-27 DIAGNOSIS — M15.9 PRIMARY OSTEOARTHRITIS INVOLVING MULTIPLE JOINTS: ICD-10-CM

## 2020-02-27 NOTE — TELEPHONE ENCOUNTER
PATIENT CALLED REQUESTING REFILL ON RX HYDROcodone-acetaminophen (NORCO) 7.5-325 MG per tablet    [facility]/pharmacy #[id number] - [place], [place] - [address] AT [facility] [phone] [facility] [phone] FX     ANY QUESTIONS OR CONCERNS PLEASE CALL PATIENT AT:  [phone]       PATIENT HAS A THREE DAY SUPPLY LEFT.

## 2020-02-28 RX ORDER — HYDROCODONE BITARTRATE AND ACETAMINOPHEN 7.5; 325 MG/1; MG/1
1 TABLET ORAL EVERY 4 HOURS PRN
Qty: 150 TABLET | Refills: 0 | Status: SHIPPED | OUTPATIENT
Start: 2020-02-28 | End: 2020-03-27 | Stop reason: SDUPTHER

## 2020-03-04 DIAGNOSIS — H65.113 NON-RECURRENT ACUTE ALLERGIC OTITIS MEDIA OF BOTH EARS: ICD-10-CM

## 2020-03-04 RX ORDER — FLUTICASONE PROPIONATE 50 MCG
SPRAY, SUSPENSION (ML) NASAL
Qty: 16 G | Refills: 5 | Status: SHIPPED | OUTPATIENT
Start: 2020-03-04 | End: 2020-10-06

## 2020-03-06 RX ORDER — VALACYCLOVIR HYDROCHLORIDE 500 MG/1
500 TABLET, FILM COATED ORAL 2 TIMES DAILY
Qty: 20 TABLET | Refills: 0 | Status: SHIPPED | OUTPATIENT
Start: 2020-03-06 | End: 2020-03-27 | Stop reason: SDUPTHER

## 2020-03-06 NOTE — TELEPHONE ENCOUNTER
PT called to get a refill of valACYclovir (VALTREX) 500 MG tablet. PT is completely out of meds.    [facility] [address] [facility]

## 2020-03-11 DIAGNOSIS — F41.9 ANXIETY: ICD-10-CM

## 2020-03-11 RX ORDER — ALPRAZOLAM 0.5 MG/1
0.5 TABLET ORAL 2 TIMES DAILY
Qty: 60 TABLET | Refills: 0 | Status: SHIPPED | OUTPATIENT
Start: 2020-03-11 | End: 2020-04-14

## 2020-03-11 NOTE — TELEPHONE ENCOUNTER
PATIENT  REQUESTING A REFILL OF THE ALPRAZolam (XANAX) 0.5 MG tablet  . PATIENT HAS TWO DAYS WORTH OF THIS MEDICAINE LEFT .  SENT PHARMACY VERIFIED  [facility] [address] [phone]  . PATENT CALL BACK  [phone].

## 2020-03-19 ENCOUNTER — TELEPHONE (OUTPATIENT)
Dept: INTERNAL MEDICINE | Facility: CLINIC | Age: 60
End: 2020-03-19

## 2020-03-19 NOTE — TELEPHONE ENCOUNTER
Spoke with pt, advised she should follow CDC guidelines of social distancing. Advised pt of what those guidelines were, from the CDC web site. She said she wanted to know if she should still work or stay home, I advised that it is up to her and whether she feels comfortable or not.

## 2020-03-19 NOTE — TELEPHONE ENCOUNTER
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 SHE STATES THAT SHE SAW ON THE NEWS LAST NIGHT THAT A CLEANING COMPANY STATED THAT THEY SHOULD TAKE THE NEXT 2 WEEKS OFF. PATIENT IS CONCERNED AND IS NOT SURE IF SHE SHOULD TAKE THE NEXT 2 WEEKS OFF. SHE STATES THAT SHE DOES USE GLOVES, LYSOL WIPES AND BLEACH. SHE IS REQUESTING A CALL BACK TO DISCUSS WHAT SHOULD BE THE BEST OPTION FOR HER. SHE CAN BE REACHED -[phone].

## 2020-03-19 NOTE — TELEPHONE ENCOUNTER
She should follow CDC guideline of social distancing.   I can not provide any more information than what is available on CDC website or make suggestions regarding her job.   There is risk of exposure when leaving the house.     JUAN

## 2020-03-24 ENCOUNTER — TELEPHONE (OUTPATIENT)
Dept: INTERNAL MEDICINE | Facility: CLINIC | Age: 60
End: 2020-03-24

## 2020-03-24 NOTE — TELEPHONE ENCOUNTER
PT STATED THAT HER INSURANCE STATED THAT IF SHE DID NOT PAY HER PREMIUM BY TOMORROW SHE WOULD LOSE HER COVERAGE. PT IS CONCERNED BECAUSE HE BUSINESS IS NOW CLOSED DUE TO COVID-19. PT REQUESTED TO KNOW IF THE FOLLOWING MEDICATIONS CAN BE CALLED IN TODAY SO SHE COULD GET THEM FILLED BEFORE HER INSURANCE LAPSES.     HYDROcodone-acetaminophen (NORCO) 7.5-325 MG per tablet  meloxicam (MOBIC) 15 MG tablet  carisoprodol (SOMA) 350 MG tablet  DULoxetine (CYMBALTA) 60 MG capsule  montelukast (SINGULAIR) 10 MG tablet  valACYclovir (VALTREX) 500 MG tablet    PLEASE ADVISE [phone]

## 2020-03-24 NOTE — TELEPHONE ENCOUNTER
Pt notified that we could not send over the prescriptions because they are not due yet. Also explained that even if we did send them over they would not be filled until they are suppose to so she would still have to pay for them.

## 2020-03-27 ENCOUNTER — TELEPHONE (OUTPATIENT)
Dept: INTERNAL MEDICINE | Facility: CLINIC | Age: 60
End: 2020-03-27

## 2020-03-27 DIAGNOSIS — G89.4 CHRONIC PAIN SYNDROME: ICD-10-CM

## 2020-03-27 DIAGNOSIS — M15.9 PRIMARY OSTEOARTHRITIS INVOLVING MULTIPLE JOINTS: ICD-10-CM

## 2020-03-27 RX ORDER — CARISOPRODOL 350 MG/1
TABLET ORAL
Qty: 120 TABLET | Refills: 0 | Status: SHIPPED | OUTPATIENT
Start: 2020-03-27 | End: 2020-05-21

## 2020-03-27 RX ORDER — HYDROCODONE BITARTRATE AND ACETAMINOPHEN 7.5; 325 MG/1; MG/1
1 TABLET ORAL EVERY 4 HOURS PRN
Qty: 150 TABLET | Refills: 0 | Status: SHIPPED | OUTPATIENT
Start: 2020-03-27 | End: 2020-04-28 | Stop reason: SDUPTHER

## 2020-03-27 RX ORDER — VALACYCLOVIR HYDROCHLORIDE 500 MG/1
500 TABLET, FILM COATED ORAL 2 TIMES DAILY
Qty: 20 TABLET | Refills: 0 | Status: SHIPPED | OUTPATIENT
Start: 2020-03-27 | End: 2020-06-17

## 2020-03-27 RX ORDER — DULOXETIN HYDROCHLORIDE 60 MG/1
120 CAPSULE, DELAYED RELEASE ORAL DAILY
Qty: 60 CAPSULE | Refills: 5 | Status: SHIPPED | OUTPATIENT
Start: 2020-03-27 | End: 2021-02-16

## 2020-03-27 RX ORDER — MELOXICAM 15 MG/1
15 TABLET ORAL DAILY
Qty: 30 TABLET | Refills: 5 | Status: SHIPPED | OUTPATIENT
Start: 2020-03-27 | End: 2020-05-19 | Stop reason: SDUPTHER

## 2020-03-27 NOTE — TELEPHONE ENCOUNTER
PATIENT STATE SHE CAN SELF PAY FOR HER MEDICATIONS AND WOULD LIKE TO HAVE THEM FILLED.     HYDROcodone-acetaminophen (NORCO) 7.5-325 MG per tablet  meloxicam (MOBIC) 15 MG tablet  carisoprodol (SOMA) 350 MG tablet  DULoxetine (CYMBALTA) 60 MG capsule  valACYclovir (VALTREX) 500 MG tablet    PATIENT CALLBACK [phone]

## 2020-04-06 ENCOUNTER — TELEPHONE (OUTPATIENT)
Dept: INTERNAL MEDICINE | Facility: CLINIC | Age: 60
End: 2020-04-06

## 2020-04-06 NOTE — TELEPHONE ENCOUNTER
Patient called in and stated back in February she had the same symptoms people are being screened for the COVID-19.   Patient wants to know if she would need any kind of testing?    Please call back to fuentes @ [phone]

## 2020-04-08 ENCOUNTER — OFFICE VISIT (OUTPATIENT)
Dept: INTERNAL MEDICINE | Facility: CLINIC | Age: 60
End: 2020-04-08

## 2020-04-08 DIAGNOSIS — F41.9 ANXIETY: ICD-10-CM

## 2020-04-08 DIAGNOSIS — M79.7 FIBROMYALGIA, PRIMARY: Primary | ICD-10-CM

## 2020-04-08 DIAGNOSIS — F17.200 CURRENT SMOKER: ICD-10-CM

## 2020-04-08 DIAGNOSIS — G89.4 CHRONIC PAIN SYNDROME: ICD-10-CM

## 2020-04-08 PROCEDURE — 99441 PR PHYS/QHP TELEPHONE EVALUATION 5-10 MIN: CPT | Performed by: NURSE PRACTITIONER

## 2020-04-08 NOTE — PROGRESS NOTES
Name: Marina Sotelo  :  1960  Provider: Diego Pinzon III, NP-C     Subjective:      Chief Complaint   Patient presents with   • Med Refill   Follow-up chronic anxiety, back pain    Marina Sotelo is a 59 y.o. female who has scheduled an Telephone Visit.     You have chosen to receive care through a telephone visit today. Do you consent to use a telephone visit for your medical care today? Yes       She has chronic anxiety and depression.  She has been going through a lot with her  lately.  She states she has been controlled on cymbalta and xanax.  No SOA, CP      She had moved out from her house but now back living with .  States he is no longer drinking and they are doing much better.   She is not currently working due to pandemic.  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       She has chronic OA in multiple joints and fibromyalgia  and has been on norco for over 3 years.  She states she is able to work with norco otherwise she is unable related to pain.  She is taking 4-5 tabs daily.  Working in the yard to have some activity and controls pain so she can function.   Soma 2-3 times a day for spasms.     Chronic allergies.  Controlled with zytrec.    States she is eating better and cutting out sugar.      I have reviewed the patient's medical history in detail and updated the computerized patient record.    Past Medical History:   Diagnosis Date   • Anxiety    • Arthritis    • Cervical disc disease with myelopathy 2019   • Chronic pain    • Depression, acute     recurrent   • Fibromyalgia, primary    • Hypertension    • Left hip pain     c/o pain in left hip       Past Surgical History:   Procedure Laterality Date   • TUBAL ABDOMINAL LIGATION         Family History   Problem Relation Age of Onset   • Cancer Mother         Colon  at 47 yoa    • Cancer Father         Brain   • Cancer Sister         Pancreatic   • No Known Problems  Brother    • No Known Problems Maternal Aunt    • No Known Problems Maternal Uncle    • No Known Problems Paternal Aunt    • No Known Problems Paternal Uncle    • No Known Problems Maternal Grandmother    • No Known Problems Maternal Grandfather    • Breast cancer Paternal Grandmother    • No Known Problems Paternal Grandfather    • Anesthesia problems Neg Hx    • Broken bones Neg Hx    • Clotting disorder Neg Hx    • Collagen disease Neg Hx    • Diabetes Neg Hx    • Dislocations Neg Hx    • Osteoporosis Neg Hx    • Rheumatologic disease Neg Hx    • Scoliosis Neg Hx    • Severe sprains Neg Hx        Social History     Socioeconomic History   • Marital status:      Spouse name: Not on file   • Number of children: Not on file   • Years of education: Not on file   • Highest education level: Not on file   Tobacco Use   • Smoking status: Current Every Day Smoker   • Smokeless tobacco: Never Used   Substance and Sexual Activity   • Alcohol use: Yes     Comment: occasional   • Drug use: Yes     Types: Hydrocodone   • Sexual activity: Defer       Most Recent Immunizations   Administered Date(s) Administered   • Flu Vaccine Quad PF 6-35MO 10/09/2017   • Flu Vaccine Quad PF >36MO 10/09/2017   • Influenza TIV (IM) 10/28/2016   • Influenza, Unspecified 09/25/2015   • Td 07/26/2001   • Tdap 01/28/2010   • flucelvax quad pfs =>4 YRS 10/29/2019         Review of Systems:   Review of Systems      Objective:      Physical Exam:   NO Physical exam due to telephone visit    Patient sounded: Alert and Oriented x3    Psych: Calm and responded to questions appropriately     Vital Signs:  NO Vitals due to telephone visit     Results Review:      REVIEWED AND DISCUSSED LAB RESULTS WITH PATIENT      Requested Prescriptions      No prescriptions requested or ordered in this encounter     Routine medications provided by this office will also be refilled via pharmacy request.       Current Outpatient Medications:   •  ALPRAZolam (XANAX)  0.5 MG tablet, Take 1 tablet by mouth 2 (Two) Times a Day., Disp: 60 tablet, Rfl: 0  •  betamethasone valerate (VALISONE) 0.1 % cream, Apply  topically to the appropriate area as directed 2 (Two) Times a Day., Disp: 45 g, Rfl: 5  •  carisoprodol (SOMA) 350 MG tablet, TAKE 1 TABLET BY MOUTH FOUR TIMES A DAY AS NEEDED FOR MUSCLE SPASMS, Disp: 120 tablet, Rfl: 0  •  cetirizine (ZyrTEC) 10 MG tablet, Take 10 mg by mouth daily., Disp: , Rfl:   •  DULoxetine (CYMBALTA) 60 MG capsule, Take 2 capsules by mouth Daily., Disp: 60 capsule, Rfl: 5  •  fluticasone (FLONASE) 50 MCG/ACT nasal spray, SPRAY 2 SPRAYS INTO THE NOSTRIL(S) AS DIRECTED BY PROVIDER DAILY., Disp: 16 g, Rfl: 5  •  halobetasol (ULTRAVATE) 0.05 % cream, APPLY  CREAM TOPICALLY TWICE DAILY, Disp: 50 g, Rfl: 0  •  HYDROcodone-acetaminophen (NORCO) 7.5-325 MG per tablet, Take 1 tablet by mouth Every 4 (Four) Hours As Needed for Moderate Pain  or Severe Pain ., Disp: 150 tablet, Rfl: 0  •  meloxicam (MOBIC) 15 MG tablet, Take 1 tablet by mouth Daily., Disp: 30 tablet, Rfl: 5  •  montelukast (SINGULAIR) 10 MG tablet, TAKE 1 TABLET BY MOUTH EVERY DAY AT NIGHT, Disp: 30 tablet, Rfl: 11  •  mupirocin (BACTROBAN) 2 % ointment, Apply  topically 3 (Three) Times a Day., Disp: 22 g, Rfl: 5  •  valACYclovir (VALTREX) 500 MG tablet, Take 1 tablet by mouth 2 (Two) Times a Day., Disp: 20 tablet, Rfl: 0       Assessment and Plan:        Problem List Items Addressed This Visit        Nervous and Auditory    Chronic pain syndrome     Controlled on current medication  Continue to use lowest effective dose  Reevaluate at next appointment.         Fibromyalgia, primary - Primary       Other    Anxiety     Controlled on current medication  Reassess at next visit         Current smoker     Continues to smoke  Declines cessation discussion but states she will work on decreasing number cigarettes per day                See patient instructions for plan.     This visit has been  "rescheduled as a phone visit to comply with patient safety concerns in accordance with CDC recommendations.  Total time of discussion was 7 minutes.     Discussed any change in Rx and discussed visit with patient.  All questions answered.      Return in about 3 months (around 7/8/2020).    Diego \"Sukumar\" Alberta, AFIA   04/08/20    Dragon disclaimer:   Much of this encounter note is an electronic transcription/translation of spoken language to printed text. The electronic translation of spoken language may permit erroneous, or at times, nonsensical words or phrases to be inadvertently transcribed; Although I have reviewed the note for such errors, some may still exist.     Additional Patient Counseling:       There are no Patient Instructions on file for this visit.  "

## 2020-04-08 NOTE — ASSESSMENT & PLAN NOTE
Continues to smoke  Declines cessation discussion but states she will work on decreasing number cigarettes per day

## 2020-04-08 NOTE — ASSESSMENT & PLAN NOTE
Controlled on current medication  Continue to use lowest effective dose  Reevaluate at next appointment.

## 2020-04-14 DIAGNOSIS — F41.9 ANXIETY: ICD-10-CM

## 2020-04-14 RX ORDER — ALPRAZOLAM 0.5 MG/1
TABLET ORAL
Qty: 60 TABLET | Refills: 0 | Status: SHIPPED | OUTPATIENT
Start: 2020-04-14 | End: 2020-05-21 | Stop reason: SDUPTHER

## 2020-04-28 DIAGNOSIS — M15.9 PRIMARY OSTEOARTHRITIS INVOLVING MULTIPLE JOINTS: ICD-10-CM

## 2020-04-28 DIAGNOSIS — G89.4 CHRONIC PAIN SYNDROME: ICD-10-CM

## 2020-04-28 NOTE — TELEPHONE ENCOUNTER
PT IS REQUESTING A REFILL ON HYDROcodone-acetaminophen (NORCO) 7.5-325 MG per tablet    [facility] [address]

## 2020-04-29 DIAGNOSIS — G89.4 CHRONIC PAIN SYNDROME: Primary | ICD-10-CM

## 2020-04-29 DIAGNOSIS — M15.9 PRIMARY OSTEOARTHRITIS INVOLVING MULTIPLE JOINTS: ICD-10-CM

## 2020-04-29 RX ORDER — HYDROCODONE BITARTRATE AND ACETAMINOPHEN 7.5; 325 MG/1; MG/1
1 TABLET ORAL EVERY 4 HOURS PRN
Qty: 150 TABLET | Refills: 0 | Status: SHIPPED | OUTPATIENT
Start: 2020-04-29 | End: 2020-04-30 | Stop reason: SDUPTHER

## 2020-04-30 RX ORDER — HYDROCODONE BITARTRATE AND ACETAMINOPHEN 7.5; 325 MG/1; MG/1
1 TABLET ORAL EVERY 4 HOURS PRN
Qty: 150 TABLET | Refills: 0 | Status: SHIPPED | OUTPATIENT
Start: 2020-04-30 | End: 2020-05-19 | Stop reason: SDUPTHER

## 2020-04-30 NOTE — TELEPHONE ENCOUNTER
She is a patient of Sukumar and it was sent to you since he is out.  She was seen 4/8/2020.  Next visit is 7/8/2020.

## 2020-05-18 ENCOUNTER — TELEPHONE (OUTPATIENT)
Dept: INTERNAL MEDICINE | Facility: CLINIC | Age: 60
End: 2020-05-18

## 2020-05-18 DIAGNOSIS — F41.9 ANXIETY: ICD-10-CM

## 2020-05-19 DIAGNOSIS — M15.9 PRIMARY OSTEOARTHRITIS INVOLVING MULTIPLE JOINTS: ICD-10-CM

## 2020-05-19 DIAGNOSIS — G89.4 CHRONIC PAIN SYNDROME: ICD-10-CM

## 2020-05-19 DIAGNOSIS — J30.1 ACUTE SEASONAL ALLERGIC RHINITIS DUE TO POLLEN: ICD-10-CM

## 2020-05-19 NOTE — TELEPHONE ENCOUNTER
PATIENT IS REQUESTING A REFILL ON THE FOLLOWING MEDICATIONS:  HYDROcodone-acetaminophen (NORCO) 7.5-325 MG per tablet  meloxicam (MOBIC) 15 MG tablet  montelukast (SINGULAIR) 10 MG tablet. I CONFIRMED THE [facility]ACY OF [facility] ON [address].

## 2020-05-20 ENCOUNTER — TELEPHONE (OUTPATIENT)
Dept: INTERNAL MEDICINE | Facility: CLINIC | Age: 60
End: 2020-05-20

## 2020-05-20 DIAGNOSIS — F41.9 ANXIETY: ICD-10-CM

## 2020-05-20 RX ORDER — MELOXICAM 15 MG/1
15 TABLET ORAL DAILY
Qty: 30 TABLET | Refills: 2 | Status: SHIPPED | OUTPATIENT
Start: 2020-05-20 | End: 2020-12-09

## 2020-05-20 RX ORDER — MONTELUKAST SODIUM 10 MG/1
10 TABLET ORAL
Qty: 30 TABLET | Refills: 2 | Status: SHIPPED | OUTPATIENT
Start: 2020-05-20 | End: 2020-09-17

## 2020-05-20 RX ORDER — HYDROCODONE BITARTRATE AND ACETAMINOPHEN 7.5; 325 MG/1; MG/1
1 TABLET ORAL EVERY 4 HOURS PRN
Qty: 150 TABLET | Refills: 0 | Status: SHIPPED | OUTPATIENT
Start: 2020-05-20 | End: 2020-06-25 | Stop reason: SDUPTHER

## 2020-05-20 NOTE — TELEPHONE ENCOUNTER
PATIENT CALLED IN AND STATED SHE NEEDS A REFILL OF ALPRAZolam (XANAX) 0.5 MG tablet SENT TO [facility] [address] . PATIENT IS COMPLETELY OUT OF THIS MEDICATION . PATIENT CALL BACK [phone].

## 2020-05-21 RX ORDER — ALPRAZOLAM 0.5 MG/1
0.5 TABLET ORAL 2 TIMES DAILY
Qty: 60 TABLET | Refills: 0 | Status: SHIPPED | OUTPATIENT
Start: 2020-05-21 | End: 2020-05-22 | Stop reason: SDUPTHER

## 2020-05-21 NOTE — TELEPHONE ENCOUNTER
Patient called back and is requesting a call back regarding refill for her ALPRAZolam (XANAX) 0.5 MG tablet. Patient is worried about withdraw due to not having medication for several days and not sleeping     Please advise   [phone]

## 2020-05-22 RX ORDER — ALPRAZOLAM 0.5 MG/1
TABLET ORAL
Qty: 60 TABLET | Refills: 0 | OUTPATIENT
Start: 2020-05-22

## 2020-05-22 RX ORDER — ALPRAZOLAM 0.5 MG/1
0.5 TABLET ORAL 2 TIMES DAILY
Qty: 60 TABLET | Refills: 0 | Status: SHIPPED | OUTPATIENT
Start: 2020-05-22 | End: 2020-07-16

## 2020-05-28 ENCOUNTER — TELEPHONE (OUTPATIENT)
Dept: INTERNAL MEDICINE | Facility: CLINIC | Age: 60
End: 2020-05-28

## 2020-05-28 DIAGNOSIS — G89.4 CHRONIC PAIN SYNDROME: ICD-10-CM

## 2020-05-28 DIAGNOSIS — M15.9 PRIMARY OSTEOARTHRITIS INVOLVING MULTIPLE JOINTS: ICD-10-CM

## 2020-05-28 RX ORDER — HYDROCODONE BITARTRATE AND ACETAMINOPHEN 7.5; 325 MG/1; MG/1
1 TABLET ORAL EVERY 4 HOURS PRN
Qty: 150 TABLET | Refills: 0 | Status: CANCELLED | OUTPATIENT
Start: 2020-05-28

## 2020-05-28 NOTE — TELEPHONE ENCOUNTER
PATIENT CALLED TO REQUEST A REFILL OF THE HYDROCODONE-ACETAMINOPHEN (NORCO) 7.5-325 MG PER TABLET.    THE PATIENT STATED THAT SHE TAKES 5-6 TABLETS A DAY, AND SHE WILL BE COMPLETELY OUT OF THIS MEDICATION ON SATURDAY (5/30/20).    I CONFIRMED THE CORRECT PHARMACY WITH THE PATIENT TO BE [facility] ON [address]    IF THERE ARE ANY QUESTIONS OR CONCERNS PLEASE CALL THE PATIENT [phone] OR THE PHARMACY AT [facility] -[phone].

## 2020-05-28 NOTE — TELEPHONE ENCOUNTER
Pt returned Adelaida's call, I spoke with Pt and verified that her RX's were sent to her pharmacy on  5/22/2020 and 5/20/2020. Pt stated her pharmacy never called and asked if I could call her pharmacy to make sure they received the RX.     I advised the Pt we did send in the two prescriptions and that they confirmed on our end that they were sent. She may need to call her pharmacy and double check with them that they received then to call us back if there are any further issues.    Pt agreed .

## 2020-05-29 DIAGNOSIS — M15.9 PRIMARY OSTEOARTHRITIS INVOLVING MULTIPLE JOINTS: ICD-10-CM

## 2020-05-29 DIAGNOSIS — G89.4 CHRONIC PAIN SYNDROME: ICD-10-CM

## 2020-05-29 NOTE — TELEPHONE ENCOUNTER
----- Message from Tory Crow sent at 2/16/2017  1:32 PM EST -----   [phone]  Refill on [place]    Suture Removal: 7 days

## 2020-06-02 RX ORDER — CARISOPRODOL 350 MG/1
TABLET ORAL
Qty: 120 TABLET | Refills: 1 | Status: SHIPPED | OUTPATIENT
Start: 2020-06-02 | End: 2020-06-25 | Stop reason: SDUPTHER

## 2020-06-02 NOTE — TELEPHONE ENCOUNTER
PATIENT CALLED AND STATED THAT'S HE HAD NOT RECEIVED HER MEDICATION. SHE STATED THAT SHE HAD REACHED OUT TO HER PHARMACY BUT THEY HAVE NOT GOTTEN IT YET. PATIENT WOULD LIKE AN CALLBACK WHENTHE MEDICATION IS SENT TO THE PHARMACY.

## 2020-06-17 RX ORDER — VALACYCLOVIR HYDROCHLORIDE 500 MG/1
TABLET, FILM COATED ORAL
Qty: 20 TABLET | Refills: 2 | Status: SHIPPED | OUTPATIENT
Start: 2020-06-17 | End: 2020-11-05

## 2020-06-25 DIAGNOSIS — G89.4 CHRONIC PAIN SYNDROME: ICD-10-CM

## 2020-06-25 DIAGNOSIS — M15.9 PRIMARY OSTEOARTHRITIS INVOLVING MULTIPLE JOINTS: ICD-10-CM

## 2020-07-01 RX ORDER — CARISOPRODOL 350 MG/1
TABLET ORAL
Qty: 120 TABLET | Refills: 1 | Status: SHIPPED | OUTPATIENT
Start: 2020-07-01 | End: 2020-10-06

## 2020-07-01 RX ORDER — HYDROCODONE BITARTRATE AND ACETAMINOPHEN 7.5; 325 MG/1; MG/1
1 TABLET ORAL EVERY 4 HOURS PRN
Qty: 150 TABLET | Refills: 0 | Status: SHIPPED | OUTPATIENT
Start: 2020-07-01 | End: 2020-07-09

## 2020-07-08 ENCOUNTER — OFFICE VISIT (OUTPATIENT)
Dept: INTERNAL MEDICINE | Facility: CLINIC | Age: 60
End: 2020-07-08

## 2020-07-08 VITALS
OXYGEN SATURATION: 99 % | SYSTOLIC BLOOD PRESSURE: 134 MMHG | HEIGHT: 63 IN | BODY MASS INDEX: 30.65 KG/M2 | HEART RATE: 84 BPM | DIASTOLIC BLOOD PRESSURE: 72 MMHG | WEIGHT: 173 LBS

## 2020-07-08 DIAGNOSIS — M79.7 FIBROMYALGIA, PRIMARY: ICD-10-CM

## 2020-07-08 DIAGNOSIS — G89.4 CHRONIC PAIN SYNDROME: Primary | ICD-10-CM

## 2020-07-08 DIAGNOSIS — F41.9 ANXIETY: ICD-10-CM

## 2020-07-08 DIAGNOSIS — M15.9 PRIMARY OSTEOARTHRITIS INVOLVING MULTIPLE JOINTS: ICD-10-CM

## 2020-07-08 PROBLEM — N30.00 ACUTE CYSTITIS: Status: RESOLVED | Noted: 2018-04-24 | Resolved: 2020-07-08

## 2020-07-08 PROBLEM — M23.92 LOCKING OF LEFT KNEE: Status: RESOLVED | Noted: 2018-03-01 | Resolved: 2020-07-08

## 2020-07-08 PROBLEM — R25.2 CRAMPS, MUSCLE, GENERAL: Status: RESOLVED | Noted: 2018-10-30 | Resolved: 2020-07-08

## 2020-07-08 PROCEDURE — 99214 OFFICE O/P EST MOD 30 MIN: CPT | Performed by: NURSE PRACTITIONER

## 2020-07-08 RX ORDER — BUSPIRONE HYDROCHLORIDE 5 MG/1
5 TABLET ORAL 3 TIMES DAILY
Qty: 90 TABLET | Refills: 1 | Status: SHIPPED | OUTPATIENT
Start: 2020-07-08 | End: 2020-10-06
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
## 2020-07-09 DIAGNOSIS — G89.4 CHRONIC PAIN SYNDROME: ICD-10-CM

## 2020-07-09 DIAGNOSIS — M15.9 PRIMARY OSTEOARTHRITIS INVOLVING MULTIPLE JOINTS: ICD-10-CM

## 2020-07-09 RX ORDER — HYDROCODONE BITARTRATE AND ACETAMINOPHEN 7.5; 325 MG/1; MG/1
1 TABLET ORAL EVERY 6 HOURS PRN
Qty: 120 TABLET | Refills: 0
Start: 2020-07-09 | End: 2020-09-02 | Stop reason: SDUPTHER

## 2020-07-15 DIAGNOSIS — F41.9 ANXIETY: ICD-10-CM

## 2020-07-16 RX ORDER — ALPRAZOLAM 0.5 MG/1
TABLET ORAL
Qty: 60 TABLET | Refills: 0 | Status: SHIPPED | OUTPATIENT
Start: 2020-07-16 | End: 2020-08-18

## 2020-07-16 NOTE — TELEPHONE ENCOUNTER
Last OV   07/08/20  Next OV not scheduled  SILVINO done 06/30  CSA 01/08  please review med refill and advise

## 2020-08-14 ENCOUNTER — TELEPHONE (OUTPATIENT)
Dept: INTERNAL MEDICINE | Facility: CLINIC | Age: 60
End: 2020-08-14

## 2020-08-14 NOTE — TELEPHONE ENCOUNTER
Patient called and stated she thinks she has swimmers ear. Patient states her ear is painful and her balance is shot and would like an antibiotic be sent to    [facility]/pharmacy #[id number] - [place], [place] - [address] AT IN THE [place] - [phone] [facility] [phone] FX     Please advise     [phone]

## 2020-08-15 RX ORDER — NEOMYCIN SULFATE, POLYMYXIN B SULFATE AND HYDROCORTISONE 10; 3.5; 1 MG/ML; MG/ML; [USP'U]/ML
3 SUSPENSION/ DROPS AURICULAR (OTIC) 4 TIMES DAILY
Qty: 10 ML | Refills: 0 | Status: SHIPPED | OUTPATIENT
Start: 2020-08-15 | End: 2020-10-06

## 2020-08-17 DIAGNOSIS — F41.9 ANXIETY: ICD-10-CM

## 2020-08-18 RX ORDER — ALPRAZOLAM 0.5 MG/1
TABLET ORAL
Qty: 60 TABLET | Refills: 0 | Status: SHIPPED | OUTPATIENT
Start: 2020-08-18 | End: 2020-09-17

## 2020-09-02 DIAGNOSIS — M15.9 PRIMARY OSTEOARTHRITIS INVOLVING MULTIPLE JOINTS: ICD-10-CM

## 2020-09-02 DIAGNOSIS — G89.4 CHRONIC PAIN SYNDROME: ICD-10-CM

## 2020-09-02 NOTE — TELEPHONE ENCOUNTER
Pt is requesting a reifll on HYDROcodone-acetaminophen (NORCO) 7.5-325 MG per tablet    [facility]/pharmacy #[id number] - [place], [place] - [address] AT IN [facility] [phone] [facility] [phone] FX

## 2020-09-03 RX ORDER — HYDROCODONE BITARTRATE AND ACETAMINOPHEN 7.5; 325 MG/1; MG/1
1 TABLET ORAL EVERY 6 HOURS PRN
Qty: 120 TABLET | Refills: 0 | Status: SHIPPED | OUTPATIENT
Start: 2020-09-03 | End: 2020-09-30 | Stop reason: SDUPTHER

## 2020-09-10 ENCOUNTER — TELEPHONE (OUTPATIENT)
Dept: INTERNAL MEDICINE | Facility: CLINIC | Age: 60
End: 2020-09-10

## 2020-09-10 DIAGNOSIS — H61.20 IMPACTED CERUMEN, UNSPECIFIED LATERALITY: Primary | ICD-10-CM
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
## 2020-09-15 ENCOUNTER — TELEPHONE (OUTPATIENT)
Dept: INTERNAL MEDICINE | Facility: CLINIC | Age: 60
End: 2020-09-15

## 2020-09-15 NOTE — TELEPHONE ENCOUNTER
PATIENT CALLED TO SEE IF IT WOULD BE ALRIGHT TO INCREASE HER DOSAGE OF     DULoxetine (CYMBALTA) 60 MG capsule    SHE IS GOING THROUGH A LOT OF STRESS AT THIS TIME.   OFFERED AN OFFICE VISIT AND TELEPHONE VISIT. BUT SHE WOULD RATHER HAVE INCREASE OF MEDICATION AT THIS TIME. SHE HAS BEEN COVID TESTED AND WANTS TO STAY AWAY FROM OFFICE.   PLEASE CALL [phone]    [id number] [place] - [address] AT IN THE [place] - [phone] [facility] [phone]   [phone]

## 2020-09-16 DIAGNOSIS — J30.1 ACUTE SEASONAL ALLERGIC RHINITIS DUE TO POLLEN: ICD-10-CM

## 2020-09-16 DIAGNOSIS — F41.9 ANXIETY: ICD-10-CM

## 2020-09-17 RX ORDER — MONTELUKAST SODIUM 10 MG/1
TABLET ORAL
Qty: 90 TABLET | Refills: 0 | Status: SHIPPED | OUTPATIENT
Start: 2020-09-17

## 2020-09-17 RX ORDER — ALPRAZOLAM 0.5 MG/1
TABLET ORAL
Qty: 60 TABLET | Refills: 0 | Status: SHIPPED | OUTPATIENT
Start: 2020-09-17 | End: 2020-10-19

## 2020-09-30 DIAGNOSIS — G89.4 CHRONIC PAIN SYNDROME: ICD-10-CM

## 2020-09-30 DIAGNOSIS — M15.9 PRIMARY OSTEOARTHRITIS INVOLVING MULTIPLE JOINTS: ICD-10-CM

## 2020-10-01 RX ORDER — HYDROCODONE BITARTRATE AND ACETAMINOPHEN 7.5; 325 MG/1; MG/1
1 TABLET ORAL EVERY 6 HOURS PRN
Qty: 120 TABLET | Refills: 0 | Status: SHIPPED | OUTPATIENT
Start: 2020-10-01 | End: 2020-11-02 | Stop reason: SDUPTHER

## 2020-10-06 ENCOUNTER — OFFICE VISIT (OUTPATIENT)
Dept: INTERNAL MEDICINE | Facility: CLINIC | Age: 60
End: 2020-10-06

## 2020-10-06 VITALS
OXYGEN SATURATION: 98 % | HEIGHT: 63 IN | SYSTOLIC BLOOD PRESSURE: 130 MMHG | DIASTOLIC BLOOD PRESSURE: 80 MMHG | BODY MASS INDEX: 30.3 KG/M2 | HEART RATE: 84 BPM | WEIGHT: 171 LBS

## 2020-10-06 DIAGNOSIS — G89.4 CHRONIC PAIN SYNDROME: ICD-10-CM

## 2020-10-06 DIAGNOSIS — M79.7 FIBROMYALGIA, PRIMARY: ICD-10-CM

## 2020-10-06 DIAGNOSIS — Z23 NEEDS FLU SHOT: Primary | ICD-10-CM

## 2020-10-06 DIAGNOSIS — F17.200 CURRENT SMOKER: ICD-10-CM

## 2020-10-06 DIAGNOSIS — F41.9 ANXIETY: ICD-10-CM

## 2020-10-06 LAB
BUN SERPL-MCNC: 14 MG/DL (ref 6–20)
BUN/CREAT SERPL: 16.3 (ref 7–25)
CALCIUM SERPL-MCNC: 9.2 MG/DL (ref 8.6–10.5)
CHLORIDE SERPL-SCNC: 102 MMOL/L (ref 98–107)
CO2 SERPL-SCNC: 25.7 MMOL/L (ref 22–29)
CREAT SERPL-MCNC: 0.86 MG/DL (ref 0.57–1)
GLUCOSE SERPL-MCNC: 95 MG/DL (ref 65–99)
POTASSIUM SERPL-SCNC: 4.3 MMOL/L (ref 3.5–5.2)
SODIUM SERPL-SCNC: 137 MMOL/L (ref 136–145)

## 2020-10-06 PROCEDURE — 90686 IIV4 VACC NO PRSV 0.5 ML IM: CPT | Performed by: NURSE PRACTITIONER

## 2020-10-06 PROCEDURE — 90471 IMMUNIZATION ADMIN: CPT | Performed by: NURSE PRACTITIONER

## 2020-10-06 PROCEDURE — 99214 OFFICE O/P EST MOD 30 MIN: CPT | Performed by: NURSE PRACTITIONER
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
## 2020-10-12 NOTE — ASSESSMENT & PLAN NOTE
Stable on current rx.  Advised to cut back on xanax.  Pain management may not take her if concurrently on rx. Stop smoking, contributes to anxiety.

## 2020-10-13 ENCOUNTER — TELEPHONE (OUTPATIENT)
Dept: INTERNAL MEDICINE | Facility: CLINIC | Age: 60
End: 2020-10-13

## 2020-10-13 NOTE — TELEPHONE ENCOUNTER
Last bmp normal       Lab Results   Component Value Date    CALCIUM 9.2 10/06/2020     10/06/2020    K 4.3 10/06/2020    CO2 25.7 10/06/2020     10/06/2020    BUN 14 10/06/2020    CREATININE 0.86 10/06/2020    EGFRIFAFRI 82 10/06/2020    EGFRIFNONA 68 10/06/2020    BCR 16.3 10/06/2020

## 2020-10-16 ENCOUNTER — TELEPHONE (OUTPATIENT)
Dept: INTERNAL MEDICINE | Facility: CLINIC | Age: 60
End: 2020-10-16

## 2020-10-16 NOTE — TELEPHONE ENCOUNTER
PATIENT CALLED REQUESTING TO SPEAK WITH DR. ROBERT REGARDING HER PAIN MGMT. STATES THEY ARE JUST GOING TO DO STEROID SHOTS AND SHE IS UNINTERESTED BC SHE HAS DONE THEM BEFORE AND THEY DID NOT HELP. SHE IS WONDERING WHAT THE NEXT STEP IS IN HER TREATMENT PLAN.    PLEASE CALL BACK AS SOON AS POSSIBLE AT : [phone]

## 2020-10-18 DIAGNOSIS — F41.9 ANXIETY: ICD-10-CM

## 2020-10-20 RX ORDER — ALPRAZOLAM 0.5 MG/1
TABLET ORAL
Qty: 60 TABLET | Refills: 0 | Status: SHIPPED | OUTPATIENT
Start: 2020-10-20 | End: 2020-11-24

## 2020-10-21 ENCOUNTER — TELEPHONE (OUTPATIENT)
Dept: INTERNAL MEDICINE | Facility: CLINIC | Age: 60
End: 2020-10-21

## 2020-10-21 NOTE — TELEPHONE ENCOUNTER
Patient called in stating the pain management office she was referred to does not accept her insurance.    Please call back to advise @ [phone]

## 2020-11-02 ENCOUNTER — TELEPHONE (OUTPATIENT)
Dept: INTERNAL MEDICINE | Facility: CLINIC | Age: 60
End: 2020-11-02

## 2020-11-02 DIAGNOSIS — G89.4 CHRONIC PAIN SYNDROME: ICD-10-CM

## 2020-11-02 DIAGNOSIS — M15.9 PRIMARY OSTEOARTHRITIS INVOLVING MULTIPLE JOINTS: ICD-10-CM

## 2020-11-02 RX ORDER — HYDROCODONE BITARTRATE AND ACETAMINOPHEN 7.5; 325 MG/1; MG/1
1 TABLET ORAL EVERY 6 HOURS PRN
Qty: 120 TABLET | Refills: 0 | Status: SHIPPED | OUTPATIENT
Start: 2020-11-02
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
## 2020-11-02 NOTE — TELEPHONE ENCOUNTER
Caller: Marina Sotelo    Relationship: Self    Best call back number:[phone]    Medication needed:   Requested Prescriptions     Pending Prescriptions Disp Refills   • HYDROcodone-acetaminophen (NORCO) 7.5-325 MG per tablet 120 tablet 0     Sig: Take 1 tablet by mouth Every 6 (Six) Hours As Needed for Moderate Pain  or Severe Pain .       When do you need the refill by: 11/02    What details did the patient provide when requesting the medication: PATIENT IS COMPLETELY OUT,     Does the patient have less than a 3 day supply:  [x] Yes  [] No    What is the patient's preferred pharmacy: [facility]/PH[facility]ACY #[id number] [place], [place] - [address] AT IN [facility] [phone] [facility] [phone]

## 2020-11-05 RX ORDER — VALACYCLOVIR HYDROCHLORIDE 500 MG/1
TABLET, FILM COATED ORAL
Qty: 20 TABLET | Refills: 2 | Status: SHIPPED | OUTPATIENT
Start: 2020-11-05

## 2020-11-13 ENCOUNTER — TELEPHONE (OUTPATIENT)
Dept: INTERNAL MEDICINE | Facility: CLINIC | Age: 60
End: 2020-11-13

## 2020-11-13 NOTE — TELEPHONE ENCOUNTER
PATIENT NEEDS DR ROBERT TO CALL TO DISCUSS HER PAIN MANAGEMENT DOCTOR'S/ THERAPY/ MRI'S  FOR LOWER PRICES.  SHE CAN'T AFFORD HER CURRENT ONES.    PLEASE ADVISE: [phone]

## 2020-11-16 NOTE — TELEPHONE ENCOUNTER
ADDY    I spoke with the pt and the  MRI will be $800 (her part)  Pain management  Visit $200 (her part)   She's been once and is suppose to go back on the 3rd.      Pt advised to check with her insurance company and get a list of participating providers-pt will do that and let us know

## 2020-11-23 DIAGNOSIS — F41.9 ANXIETY: ICD-10-CM

## 2020-11-24 RX ORDER — ALPRAZOLAM 0.5 MG/1
TABLET ORAL
Qty: 60 TABLET | Refills: 0 | Status: SHIPPED | OUTPATIENT
Start: 2020-11-24

## 2020-12-07 DIAGNOSIS — M15.9 PRIMARY OSTEOARTHRITIS INVOLVING MULTIPLE JOINTS: ICD-10-CM

## 2020-12-07 DIAGNOSIS — G89.4 CHRONIC PAIN SYNDROME: ICD-10-CM

## 2020-12-07 RX ORDER — HYDROCODONE BITARTRATE AND ACETAMINOPHEN 7.5; 325 MG/1; MG/1
1 TABLET ORAL EVERY 6 HOURS PRN
Qty: 120 TABLET | Refills: 0 | OUTPATIENT
Start: 2020-12-07

## 2020-12-07 NOTE — TELEPHONE ENCOUNTER
Caller: Marina Sotelo    Relationship: Self    Best call back number: [phone]    Medication needed:   Requested Prescriptions     Pending Prescriptions Disp Refills   • HYDROcodone-acetaminophen (NORCO) 7.5-325 MG per tablet 120 tablet 0     Sig: Take 1 tablet by mouth Every 6 (Six) Hours As Needed for Moderate Pain  or Severe Pain .       When do you need the refill by: ASAP    What details did the patient provide when requesting the medication: Patient is out of medication.    Does the patient have less than a 3 day supply:  [x] Yes  [] No    What is the patient's preferred pharmacy: [facility]/PHARMACY #[id number] [place], [place] - [address] AT IN [facility] [phone] [facility] [phone]

## 2020-12-07 NOTE — TELEPHONE ENCOUNTER
Patient was referred to pain management.    She has had 2 appointments with pain mgt. , Nov and 12/2.     She was not compliant with tx plan and they did not take over her pain rx.  She is to return there in 6 wks if she complies with their tx plan.

## 2020-12-09 RX ORDER — MELOXICAM 15 MG/1
TABLET ORAL
Qty: 30 TABLET | Refills: 2 | Status: SHIPPED | OUTPATIENT
Start: 2020-12-09 | End: 2021-02-28

## 2020-12-17 DIAGNOSIS — M15.9 PRIMARY OSTEOARTHRITIS INVOLVING MULTIPLE JOINTS: ICD-10-CM

## 2020-12-17 DIAGNOSIS — G89.4 CHRONIC PAIN SYNDROME: ICD-10-CM

## 2020-12-17 RX ORDER — HYDROCODONE BITARTRATE AND ACETAMINOPHEN 7.5; 325 MG/1; MG/1
1 TABLET ORAL EVERY 6 HOURS PRN
Qty: 120 TABLET | Refills: 0 | OUTPATIENT
Start: 2020-12-17

## 2020-12-17 NOTE — TELEPHONE ENCOUNTER
Caller: Marina Sotelo    Relationship: Self    Best call back number:     Medication needed:   Requested Prescriptions     Pending Prescriptions Disp Refills   • HYDROcodone-acetaminophen (NORCO) 7.5-325 MG per tablet 120 tablet 0     Sig: Take 1 tablet by mouth Every 6 (Six) Hours As Needed for Moderate Pain  or Severe Pain .       When do you need the refill by:     What details did the patient provide when requesting the medication: PT IS OUT OF THIS MEDICATION    Does the patient have less than a 3 day supply:  [x] Yes  [] No    What is the patient's preferred pharmacy:    [address]  [phone]

## 2020-12-18 NOTE — TELEPHONE ENCOUNTER
2nd refusal     Patient was referred to pain management.    She has had 2 appointments with pain mgt. , Nov and December.      She was not compliant with tx plan, work up and they did not take over her pain rx.  She is to return there in 4 wks if she complies with their tx plan.

## 2021-02-16 RX ORDER — DULOXETIN HYDROCHLORIDE 60 MG/1
CAPSULE, DELAYED RELEASE ORAL
Qty: 60 CAPSULE | Refills: 0 | Status: SHIPPED | OUTPATIENT
Start: 2021-02-16 | End: 2021-03-16

## 2021-02-28 RX ORDER — MELOXICAM 15 MG/1
TABLET ORAL
Qty: 30 TABLET | Refills: 1 | Status: SHIPPED | OUTPATIENT
Start: 2021-02-28

## 2021-03-16 RX ORDER — DULOXETIN HYDROCHLORIDE 60 MG/1
CAPSULE, DELAYED RELEASE ORAL
Qty: 60 CAPSULE | Refills: 0 | Status: SHIPPED | OUTPATIENT
Start: 2021-03-16

## 2021-04-22 RX ORDER — DULOXETIN HYDROCHLORIDE 60 MG/1
CAPSULE, DELAYED RELEASE ORAL
Qty: 60 CAPSULE | Refills: 0 | OUTPATIENT
Start: 2021-04-22

## 2021-06-02 RX ORDER — MELOXICAM 15 MG/1
TABLET ORAL
Qty: 30 TABLET | Refills: 1 | OUTPATIENT
Start: 2021-06-02

## 2024-04-29 ENCOUNTER — ON CAMPUS - OUTPATIENT (OUTPATIENT)
Dept: URBAN - METROPOLITAN AREA HOSPITAL 108 | Facility: HOSPITAL | Age: 64
End: 2024-04-29

## 2024-04-29 DIAGNOSIS — R93.3 ABNORMAL FINDINGS ON DIAGNOSTIC IMAGING OF OTHER PARTS OF DI: ICD-10-CM

## 2024-04-29 DIAGNOSIS — R10.9 UNSPECIFIED ABDOMINAL PAIN: ICD-10-CM

## 2024-04-29 DIAGNOSIS — K62.5 HEMORRHAGE OF ANUS AND RECTUM: ICD-10-CM

## 2024-04-29 PROCEDURE — 99222 1ST HOSP IP/OBS MODERATE 55: CPT | Performed by: INTERNAL MEDICINE

## 2024-05-29 ENCOUNTER — OFFICE (OUTPATIENT)
Dept: URBAN - METROPOLITAN AREA CLINIC 76 | Facility: CLINIC | Age: 64
End: 2024-05-29

## 2024-05-29 VITALS
SYSTOLIC BLOOD PRESSURE: 132 MMHG | WEIGHT: 167 LBS | HEIGHT: 62 IN | DIASTOLIC BLOOD PRESSURE: 78 MMHG | HEART RATE: 70 BPM

## 2024-05-29 DIAGNOSIS — R93.3 ABNORMAL FINDINGS ON DIAGNOSTIC IMAGING OF OTHER PARTS OF DI: ICD-10-CM

## 2024-05-29 DIAGNOSIS — K62.5 HEMORRHAGE OF ANUS AND RECTUM: ICD-10-CM

## 2024-05-29 DIAGNOSIS — K55.039 ACUTE (REVERSIBLE) ISCHEMIA OF LARGE INTESTINE, EXTENT UNSPE: ICD-10-CM

## 2024-05-29 DIAGNOSIS — R10.32 LEFT LOWER QUADRANT PAIN: ICD-10-CM

## 2024-05-29 PROCEDURE — 99213 OFFICE O/P EST LOW 20 MIN: CPT | Performed by: INTERNAL MEDICINE

## 2024-12-05 NOTE — ASSESSMENT & PLAN NOTE
Chronic lower back pain-patient does not recall what had started her troubles, no explanation for her pain from medical records review.  Patient had never had physical therapy and epidural injections had never been considered.  She declines MRI now due to insurance.    I will titrate down her noco - decrease quantity to 120 (qid and work on further from there).   I explained that some pain is normal and will likely never get her to zero pain.   Will get scans as soon as possible - consider referral to pain management.    She does have chronic knee pain and has been seen by Dr Gordon in that past.  States she will not have any surgery.     Discussed that she needs to start regular exercise routine.    
Continues mobic - unchanged  ? Change to celebrex at next appointment   
Worsening  Will add buspar    Discussed referral to therapist - declines due to insurance and finances at this time.   She needs to quit smoking - contributing to anxiety.   
,